# Patient Record
Sex: FEMALE | Race: WHITE | NOT HISPANIC OR LATINO | Employment: OTHER | URBAN - METROPOLITAN AREA
[De-identification: names, ages, dates, MRNs, and addresses within clinical notes are randomized per-mention and may not be internally consistent; named-entity substitution may affect disease eponyms.]

---

## 2023-02-23 LAB
ALANINE AMINOTRANSFERASE (SGPT) (U/L) IN SER/PLAS: 22 U/L (ref 7–45)
ALBUMIN (G/DL) IN SER/PLAS: 4.7 G/DL (ref 3.4–5)
ALKALINE PHOSPHATASE (U/L) IN SER/PLAS: 79 U/L (ref 33–136)
ANION GAP IN SER/PLAS: 15 MMOL/L (ref 10–20)
ASPARTATE AMINOTRANSFERASE (SGOT) (U/L) IN SER/PLAS: 20 U/L (ref 9–39)
BASOPHILS (10*3/UL) IN BLOOD BY AUTOMATED COUNT: 0.04 X10E9/L (ref 0–0.1)
BASOPHILS/100 LEUKOCYTES IN BLOOD BY AUTOMATED COUNT: 0.5 % (ref 0–2)
BILIRUBIN DIRECT (MG/DL) IN SER/PLAS: 0.1 MG/DL (ref 0–0.3)
BILIRUBIN TOTAL (MG/DL) IN SER/PLAS: 0.4 MG/DL (ref 0–1.2)
CALCIDIOL (25 OH VITAMIN D3) (NG/ML) IN SER/PLAS: 44 NG/ML
CALCIUM (MG/DL) IN SER/PLAS: 9.6 MG/DL (ref 8.6–10.6)
CARBON DIOXIDE, TOTAL (MMOL/L) IN SER/PLAS: 24 MMOL/L (ref 21–32)
CHLORIDE (MMOL/L) IN SER/PLAS: 104 MMOL/L (ref 98–107)
CHOLESTEROL (MG/DL) IN SER/PLAS: 245 MG/DL (ref 0–199)
CHOLESTEROL IN HDL (MG/DL) IN SER/PLAS: 59.3 MG/DL
CHOLESTEROL/HDL RATIO: 4.1
CREATININE (MG/DL) IN SER/PLAS: 0.61 MG/DL (ref 0.5–1.05)
EOSINOPHILS (10*3/UL) IN BLOOD BY AUTOMATED COUNT: 0.29 X10E9/L (ref 0–0.7)
EOSINOPHILS/100 LEUKOCYTES IN BLOOD BY AUTOMATED COUNT: 3.9 % (ref 0–6)
ERYTHROCYTE DISTRIBUTION WIDTH (RATIO) BY AUTOMATED COUNT: 13.5 % (ref 11.5–14.5)
ERYTHROCYTE MEAN CORPUSCULAR HEMOGLOBIN CONCENTRATION (G/DL) BY AUTOMATED: 31.9 G/DL (ref 32–36)
ERYTHROCYTE MEAN CORPUSCULAR VOLUME (FL) BY AUTOMATED COUNT: 95 FL (ref 80–100)
ERYTHROCYTES (10*6/UL) IN BLOOD BY AUTOMATED COUNT: 4.17 X10E12/L (ref 4–5.2)
GFR FEMALE: >90 ML/MIN/1.73M2
GLUCOSE (MG/DL) IN SER/PLAS: 90 MG/DL (ref 74–99)
HEMATOCRIT (%) IN BLOOD BY AUTOMATED COUNT: 39.8 % (ref 36–46)
HEMOGLOBIN (G/DL) IN BLOOD: 12.7 G/DL (ref 12–16)
IMMATURE GRANULOCYTES/100 LEUKOCYTES IN BLOOD BY AUTOMATED COUNT: 0.5 % (ref 0–0.9)
LDL: 157 MG/DL (ref 0–99)
LEUKOCYTES (10*3/UL) IN BLOOD BY AUTOMATED COUNT: 7.5 X10E9/L (ref 4.4–11.3)
LYMPHOCYTES (10*3/UL) IN BLOOD BY AUTOMATED COUNT: 2.54 X10E9/L (ref 1.2–4.8)
LYMPHOCYTES/100 LEUKOCYTES IN BLOOD BY AUTOMATED COUNT: 33.8 % (ref 13–44)
MONOCYTES (10*3/UL) IN BLOOD BY AUTOMATED COUNT: 0.61 X10E9/L (ref 0.1–1)
MONOCYTES/100 LEUKOCYTES IN BLOOD BY AUTOMATED COUNT: 8.1 % (ref 2–10)
NEUTROPHILS (10*3/UL) IN BLOOD BY AUTOMATED COUNT: 3.99 X10E9/L (ref 1.2–7.7)
NEUTROPHILS/100 LEUKOCYTES IN BLOOD BY AUTOMATED COUNT: 53.2 % (ref 40–80)
NRBC (PER 100 WBCS) BY AUTOMATED COUNT: 0 /100 WBC (ref 0–0)
PLATELETS (10*3/UL) IN BLOOD AUTOMATED COUNT: 280 X10E9/L (ref 150–450)
POTASSIUM (MMOL/L) IN SER/PLAS: 4.3 MMOL/L (ref 3.5–5.3)
PROTEIN TOTAL: 6.7 G/DL (ref 6.4–8.2)
SODIUM (MMOL/L) IN SER/PLAS: 139 MMOL/L (ref 136–145)
THYROTROPIN (MIU/L) IN SER/PLAS BY DETECTION LIMIT <= 0.05 MIU/L: 1.15 MIU/L (ref 0.44–3.98)
TRIGLYCERIDE (MG/DL) IN SER/PLAS: 146 MG/DL (ref 0–149)
UREA NITROGEN (MG/DL) IN SER/PLAS: 13 MG/DL (ref 6–23)
VLDL: 29 MG/DL (ref 0–40)

## 2023-02-28 PROBLEM — N81.10 FEMALE CYSTOCELE: Status: ACTIVE | Noted: 2023-02-28

## 2023-02-28 PROBLEM — M48.061 SPINAL STENOSIS OF LUMBAR REGION WITHOUT NEUROGENIC CLAUDICATION: Status: ACTIVE | Noted: 2023-02-28

## 2023-02-28 PROBLEM — B35.1 ONYCHOMYCOSIS: Status: ACTIVE | Noted: 2023-02-28

## 2023-02-28 PROBLEM — N39.3 STRESS INCONTINENCE: Status: ACTIVE | Noted: 2023-02-28

## 2023-02-28 PROBLEM — E78.2 COMBINED HYPERLIPIDEMIA: Status: ACTIVE | Noted: 2023-02-28

## 2023-02-28 PROBLEM — R79.89 LOW VITAMIN D LEVEL: Status: ACTIVE | Noted: 2023-02-28

## 2023-02-28 PROBLEM — M46.1 SACROILIITIS (CMS-HCC): Status: ACTIVE | Noted: 2023-02-28

## 2023-02-28 PROBLEM — M81.0 OSTEOPOROSIS: Status: ACTIVE | Noted: 2023-02-28

## 2023-02-28 PROBLEM — R00.2 PALPITATIONS: Status: ACTIVE | Noted: 2023-02-28

## 2023-02-28 RX ORDER — MELOXICAM 15 MG/1
1 TABLET ORAL DAILY
COMMUNITY
Start: 2022-08-11 | End: 2023-07-26 | Stop reason: ALTCHOICE

## 2023-02-28 RX ORDER — MULTIVITAMIN
TABLET ORAL
COMMUNITY

## 2023-02-28 RX ORDER — DENOSUMAB 60 MG/ML
60 INJECTION SUBCUTANEOUS
COMMUNITY
Start: 2020-06-15 | End: 2023-11-07 | Stop reason: SDUPTHER

## 2023-02-28 RX ORDER — MINERAL OIL
ENEMA (ML) RECTAL
COMMUNITY

## 2023-02-28 RX ORDER — LATANOPROST 50 UG/ML
SOLUTION/ DROPS OPHTHALMIC
COMMUNITY
Start: 2021-09-13

## 2023-02-28 RX ORDER — TERBINAFINE HYDROCHLORIDE 250 MG/1
250 TABLET ORAL DAILY
COMMUNITY

## 2023-03-07 ENCOUNTER — APPOINTMENT (OUTPATIENT)
Dept: PRIMARY CARE | Facility: CLINIC | Age: 68
End: 2023-03-07
Payer: MEDICARE

## 2023-06-27 ENCOUNTER — TELEPHONE (OUTPATIENT)
Dept: PRIMARY CARE | Facility: CLINIC | Age: 68
End: 2023-06-27
Payer: MEDICARE

## 2023-06-27 NOTE — TELEPHONE ENCOUNTER
PHARMACY CALLED STATING THE PATIENT NEEDS A PRIOR AUTH ON THEIR PROLIA. ITS [facility] [phone]  PRIOR AUTH KEY IS [id number]

## 2023-07-12 ENCOUNTER — TELEPHONE (OUTPATIENT)
Dept: PRIMARY CARE | Facility: CLINIC | Age: 68
End: 2023-07-12
Payer: MEDICARE

## 2023-07-12 NOTE — TELEPHONE ENCOUNTER
PT STATES THEY NEED PROOF THAT HE PROLIA IS MEDICALLY NECCESSARY DUE TO BROKEN BONES IN THE LAST 5 YRS AND TRIED ALENDRONATE THEN IT BECAME INEFFECTIVE. CAN YOU ADD IT IN THE NOTES?    # FOR .[phone]

## 2023-07-26 ENCOUNTER — OFFICE VISIT (OUTPATIENT)
Dept: PRIMARY CARE | Facility: CLINIC | Age: 68
End: 2023-07-26
Payer: MEDICARE

## 2023-07-26 VITALS
HEART RATE: 75 BPM | BODY MASS INDEX: 30.43 KG/M2 | WEIGHT: 155 LBS | SYSTOLIC BLOOD PRESSURE: 130 MMHG | HEIGHT: 60 IN | OXYGEN SATURATION: 98 % | DIASTOLIC BLOOD PRESSURE: 78 MMHG

## 2023-07-26 DIAGNOSIS — M46.1 SACROILIITIS (CMS-HCC): ICD-10-CM

## 2023-07-26 DIAGNOSIS — M81.0 OSTEOPOROSIS, UNSPECIFIED OSTEOPOROSIS TYPE, UNSPECIFIED PATHOLOGICAL FRACTURE PRESENCE: ICD-10-CM

## 2023-07-26 DIAGNOSIS — Z00.00 ROUTINE GENERAL MEDICAL EXAMINATION AT HEALTH CARE FACILITY: Primary | ICD-10-CM

## 2023-07-26 DIAGNOSIS — E78.2 COMBINED HYPERLIPIDEMIA: ICD-10-CM

## 2023-07-26 DIAGNOSIS — E78.00 PURE HYPERCHOLESTEROLEMIA: ICD-10-CM

## 2023-07-26 DIAGNOSIS — M48.061 SPINAL STENOSIS OF LUMBAR REGION WITHOUT NEUROGENIC CLAUDICATION: ICD-10-CM

## 2023-07-26 DIAGNOSIS — Z00.00 MEDICARE ANNUAL WELLNESS VISIT, SUBSEQUENT: ICD-10-CM

## 2023-07-26 PROBLEM — B35.1 ONYCHOMYCOSIS: Status: RESOLVED | Noted: 2023-02-28 | Resolved: 2023-07-26

## 2023-07-26 PROCEDURE — 1160F RVW MEDS BY RX/DR IN RCRD: CPT | Performed by: FAMILY MEDICINE

## 2023-07-26 PROCEDURE — 1170F FXNL STATUS ASSESSED: CPT | Performed by: FAMILY MEDICINE

## 2023-07-26 PROCEDURE — 1036F TOBACCO NON-USER: CPT | Performed by: FAMILY MEDICINE

## 2023-07-26 PROCEDURE — 1159F MED LIST DOCD IN RCRD: CPT | Performed by: FAMILY MEDICINE

## 2023-07-26 PROCEDURE — 99397 PER PM REEVAL EST PAT 65+ YR: CPT | Performed by: FAMILY MEDICINE

## 2023-07-26 PROCEDURE — G0439 PPPS, SUBSEQ VISIT: HCPCS | Performed by: FAMILY MEDICINE

## 2023-07-26 RX ORDER — ERGOCALCIFEROL 1.25 MG/1
50000 CAPSULE ORAL WEEKLY
COMMUNITY
Start: 2011-10-31

## 2023-07-26 ASSESSMENT — PATIENT HEALTH QUESTIONNAIRE - PHQ9
1. LITTLE INTEREST OR PLEASURE IN DOING THINGS: NOT AT ALL
2. FEELING DOWN, DEPRESSED OR HOPELESS: NOT AT ALL
SUM OF ALL RESPONSES TO PHQ9 QUESTIONS 1 AND 2: 0

## 2023-07-26 ASSESSMENT — ACTIVITIES OF DAILY LIVING (ADL)
DOING_HOUSEWORK: INDEPENDENT
MANAGING_FINANCES: INDEPENDENT
GROCERY_SHOPPING: INDEPENDENT
TAKING_MEDICATION: INDEPENDENT
DRESSING: INDEPENDENT
BATHING: INDEPENDENT

## 2023-07-26 ASSESSMENT — ENCOUNTER SYMPTOMS
DEPRESSION: 0
LOSS OF SENSATION IN FEET: 0
OCCASIONAL FEELINGS OF UNSTEADINESS: 0

## 2023-07-26 NOTE — PROGRESS NOTES
Subjective   Patient ID: Ivette Tang is a 68 y.o. female who presents for Medicare Annual Wellness Visit Subsequent (MEDICARE WELLNESS ).    HPI   Pt is doing well  Except insurance is denying her prolia  Review of Systems   All other systems reviewed and are negative.      Objective   /78   Pulse 75   Ht 1.524 m (5')   Wt 70.3 kg (155 lb)   SpO2 98%   BMI 30.27 kg/m²     Physical Exam  Constitutional:       Appearance: Normal appearance.   HENT:      Head: Normocephalic and atraumatic.      Right Ear: Tympanic membrane, ear canal and external ear normal.      Left Ear: Tympanic membrane, ear canal and external ear normal.      Nose: Nose normal.      Mouth/Throat:      Mouth: Mucous membranes are moist.      Pharynx: Oropharynx is clear.   Eyes:      Extraocular Movements: Extraocular movements intact.      Conjunctiva/sclera: Conjunctivae normal.      Pupils: Pupils are equal, round, and reactive to light.   Cardiovascular:      Rate and Rhythm: Normal rate and regular rhythm.      Pulses: Normal pulses.      Heart sounds: Normal heart sounds.   Pulmonary:      Effort: Pulmonary effort is normal.      Breath sounds: Normal breath sounds.   Abdominal:      General: Abdomen is flat. Bowel sounds are normal.      Palpations: Abdomen is soft.   Genitourinary:     Rectum: Normal.   Musculoskeletal:         General: Normal range of motion.      Cervical back: Normal range of motion and neck supple.   Skin:     General: Skin is warm and dry.      Capillary Refill: Capillary refill takes less than 2 seconds.   Neurological:      General: No focal deficit present.      Mental Status: She is alert and oriented to person, place, and time.   Psychiatric:         Mood and Affect: Mood normal.         Behavior: Behavior normal.         Thought Content: Thought content normal.         Assessment/Plan   Diagnoses and all orders for this visit:  Routine general medical examination at health care facility  Medicare  annual wellness visit, subsequent  Spinal stenosis of lumbar region without neurogenic claudication  Osteoporosis, unspecified osteoporosis type, unspecified pathological fracture presence  Sacroiliitis (CMS/HCC)  Combined hyperlipidemia  Pure hypercholesterolemia  -     Lipid Panel; Future  -     TSH with reflex to Free T4 if abnormal; Future    Pt has had hx fractures and cannot tolerate Aldondrenate do to Gi distress   Medicare Wellness Billing Compliance Satisfied    *This is a visual tool to show completion of required items on the day of the visit. Green checks will only appear on the date of visit.    Review all medications by prescribing practitioner or clinical pharmacist (such as prescriptions, OTCs, herbal therapies and supplements) documented in the medical record    Past Medical, Surgical, and Family History reviewed and updated in chart    Tobacco Use Reviewed    Alcohol Use Reviewed    Illicit Drug Use Reviewed    PHQ2/9    Falls in Last Year Reviewed    Home Safety Risk Factors Reviewed    Cognitive Impairment Reviewed    Patient Self Assessment and Health Status    Current Diet Reviewed    Exercise Frequency    ADL - Hearing Impairment    ADL - Bathing    ADL - Dressing    ADL - Walks in Home    IADL - Managing Finances    IADL - Grocery Shopping    IADL - Taking Medications    IADL - Doing Housework

## 2023-07-26 NOTE — PROGRESS NOTES
Subjective   Reason for Visit: Ivette Tang is an 68 y.o. female here for a Medicare Wellness visit.     Past Medical, Surgical, and Family History reviewed and updated in chart.    Reviewed all medications by prescribing practitioner or clinical pharmacist (such as prescriptions, OTCs, herbal therapies and supplements) and documented in the medical record.    HPI    Patient Care Team:  Olga Baptiste DO as PCP - General  Olga Baptiste DO as PCP - [employer] PCP     Review of Systems    Objective   Vitals:  /78   Pulse 75   Ht 1.524 m (5')   Wt 70.3 kg (155 lb)   SpO2 98%   BMI 30.27 kg/m²       Physical Exam    Assessment/Plan   Problem List Items Addressed This Visit     Osteoporosis    Sacroiliitis (CMS/HCC)    Spinal stenosis of lumbar region without neurogenic claudication    Combined hyperlipidemia    Medicare annual wellness visit, subsequent   Other Visit Diagnoses     Routine general medical examination at health care facility    -  Primary    Pure hypercholesterolemia        Relevant Orders    Lipid Panel    TSH with reflex to Free T4 if abnormal

## 2023-09-11 ENCOUNTER — LAB (OUTPATIENT)
Dept: LAB | Facility: LAB | Age: 68
End: 2023-09-11
Payer: MEDICARE

## 2023-09-11 DIAGNOSIS — E78.00 PURE HYPERCHOLESTEROLEMIA: ICD-10-CM

## 2023-09-11 LAB
CHOLESTEROL (MG/DL) IN SER/PLAS: 244 MG/DL (ref 0–199)
CHOLESTEROL IN HDL (MG/DL) IN SER/PLAS: 58.4 MG/DL
CHOLESTEROL/HDL RATIO: 4.2
LDL: 153 MG/DL (ref 0–99)
THYROTROPIN (MIU/L) IN SER/PLAS BY DETECTION LIMIT <= 0.05 MIU/L: 1.7 MIU/L (ref 0.44–3.98)
TRIGLYCERIDE (MG/DL) IN SER/PLAS: 163 MG/DL (ref 0–149)
VLDL: 33 MG/DL (ref 0–40)

## 2023-09-11 PROCEDURE — 36415 COLL VENOUS BLD VENIPUNCTURE: CPT

## 2023-09-11 PROCEDURE — 84443 ASSAY THYROID STIM HORMONE: CPT

## 2023-09-11 PROCEDURE — 80061 LIPID PANEL: CPT

## 2023-11-07 ENCOUNTER — TELEPHONE (OUTPATIENT)
Dept: PRIMARY CARE | Facility: CLINIC | Age: 68
End: 2023-11-07
Payer: MEDICARE

## 2023-11-07 DIAGNOSIS — M81.0 OSTEOPOROSIS, UNSPECIFIED OSTEOPOROSIS TYPE, UNSPECIFIED PATHOLOGICAL FRACTURE PRESENCE: Primary | ICD-10-CM

## 2023-11-07 RX ORDER — DENOSUMAB 60 MG/ML
60 INJECTION SUBCUTANEOUS
Qty: 1 EACH | Refills: 1 | Status: SHIPPED | OUTPATIENT
Start: 2023-11-07

## 2023-11-07 NOTE — TELEPHONE ENCOUNTER
PT CALLED STATING SHE GOT 3 PROLIA INJECTIONS APPROVED BUT PILAR NEEDS THE SCRIPT FAXED TO THEM   FAX# [phone]  PRIOR AUTH CODE #[id number]

## 2023-11-10 ENCOUNTER — OFFICE VISIT (OUTPATIENT)
Dept: PRIMARY CARE | Facility: CLINIC | Age: 68
End: 2023-11-10
Payer: MEDICARE

## 2023-11-10 DIAGNOSIS — M81.0 OSTEOPOROSIS, UNSPECIFIED OSTEOPOROSIS TYPE, UNSPECIFIED PATHOLOGICAL FRACTURE PRESENCE: ICD-10-CM

## 2023-11-10 PROCEDURE — 96372 THER/PROPH/DIAG INJ SC/IM: CPT | Performed by: FAMILY MEDICINE

## 2023-11-10 PROCEDURE — 1159F MED LIST DOCD IN RCRD: CPT | Performed by: FAMILY MEDICINE

## 2023-11-10 PROCEDURE — 1160F RVW MEDS BY RX/DR IN RCRD: CPT | Performed by: FAMILY MEDICINE

## 2023-11-10 PROCEDURE — 1036F TOBACCO NON-USER: CPT | Performed by: FAMILY MEDICINE

## 2024-03-05 ENCOUNTER — HOSPITAL ENCOUNTER (OUTPATIENT)
Dept: RADIOLOGY | Facility: CLINIC | Age: 69
Discharge: HOME | End: 2024-03-05
Payer: MEDICARE

## 2024-03-05 VITALS — BODY MASS INDEX: 30.43 KG/M2 | WEIGHT: 154.98 LBS | HEIGHT: 60 IN

## 2024-03-05 DIAGNOSIS — Z12.31 SCREENING MAMMOGRAM FOR BREAST CANCER: ICD-10-CM

## 2024-03-05 PROCEDURE — 77067 SCR MAMMO BI INCL CAD: CPT | Performed by: RADIOLOGY

## 2024-03-05 PROCEDURE — 77067 SCR MAMMO BI INCL CAD: CPT

## 2024-03-05 PROCEDURE — 77063 BREAST TOMOSYNTHESIS BI: CPT | Performed by: RADIOLOGY

## 2024-04-09 ENCOUNTER — TELEPHONE (OUTPATIENT)
Dept: PRIMARY CARE | Facility: CLINIC | Age: 69
End: 2024-04-09
Payer: MEDICARE

## 2024-04-25 ENCOUNTER — OFFICE VISIT (OUTPATIENT)
Dept: PRIMARY CARE | Facility: CLINIC | Age: 69
End: 2024-04-25
Payer: MEDICARE

## 2024-04-25 ENCOUNTER — HOSPITAL ENCOUNTER (OUTPATIENT)
Dept: RADIOLOGY | Facility: CLINIC | Age: 69
Discharge: HOME | End: 2024-04-25
Payer: MEDICARE

## 2024-04-25 VITALS
OXYGEN SATURATION: 98 % | WEIGHT: 162 LBS | SYSTOLIC BLOOD PRESSURE: 124 MMHG | HEART RATE: 80 BPM | BODY MASS INDEX: 31.64 KG/M2 | DIASTOLIC BLOOD PRESSURE: 80 MMHG

## 2024-04-25 DIAGNOSIS — M81.0 OSTEOPOROSIS, UNSPECIFIED OSTEOPOROSIS TYPE, UNSPECIFIED PATHOLOGICAL FRACTURE PRESENCE: ICD-10-CM

## 2024-04-25 DIAGNOSIS — R79.89 LOW VITAMIN D LEVEL: ICD-10-CM

## 2024-04-25 DIAGNOSIS — M46.1 SACROILIITIS (CMS-HCC): ICD-10-CM

## 2024-04-25 DIAGNOSIS — R00.2 PALPITATIONS: ICD-10-CM

## 2024-04-25 DIAGNOSIS — M81.0 OSTEOPOROSIS, UNSPECIFIED OSTEOPOROSIS TYPE, UNSPECIFIED PATHOLOGICAL FRACTURE PRESENCE: Primary | ICD-10-CM

## 2024-04-25 DIAGNOSIS — M21.70 LEG LENGTH DISCREPANCY: ICD-10-CM

## 2024-04-25 DIAGNOSIS — E83.31: ICD-10-CM

## 2024-04-25 DIAGNOSIS — E78.2 COMBINED HYPERLIPIDEMIA: ICD-10-CM

## 2024-04-25 PROCEDURE — 72110 X-RAY EXAM L-2 SPINE 4/>VWS: CPT

## 2024-04-25 PROCEDURE — 73502 X-RAY EXAM HIP UNI 2-3 VIEWS: CPT | Mod: RIGHT SIDE | Performed by: RADIOLOGY

## 2024-04-25 PROCEDURE — 73502 X-RAY EXAM HIP UNI 2-3 VIEWS: CPT | Mod: RT

## 2024-04-25 PROCEDURE — 1159F MED LIST DOCD IN RCRD: CPT | Performed by: FAMILY MEDICINE

## 2024-04-25 PROCEDURE — 72110 X-RAY EXAM L-2 SPINE 4/>VWS: CPT | Performed by: RADIOLOGY

## 2024-04-25 PROCEDURE — 1160F RVW MEDS BY RX/DR IN RCRD: CPT | Performed by: FAMILY MEDICINE

## 2024-04-25 PROCEDURE — 1036F TOBACCO NON-USER: CPT | Performed by: FAMILY MEDICINE

## 2024-04-25 PROCEDURE — 99214 OFFICE O/P EST MOD 30 MIN: CPT | Performed by: FAMILY MEDICINE

## 2024-04-25 ASSESSMENT — ENCOUNTER SYMPTOMS
OCCASIONAL FEELINGS OF UNSTEADINESS: 0
DEPRESSION: 0
LOSS OF SENSATION IN FEET: 0

## 2024-04-25 NOTE — PROGRESS NOTES
Subjective   Patient ID: Ivette Tang is a 69 y.o. female who presents for Follow-up.    HPI     Review of Systems   All other systems reviewed and are negative.  Pt has a stress fracture in her foot  Was dx in [place] last fall  No injury  She is exp difficulty getting her Prolia despite osteoporosis an fx  Pt has low back pain  Pt has hip pain  Has leg length discrepancy  Pt denies mood changes    Objective   /80   Pulse 80   Wt 73.5 kg (162 lb)   SpO2 98%   BMI 31.64 kg/m²     Physical Exam  Constitutional:       Appearance: Normal appearance.   HENT:      Head: Normocephalic and atraumatic.      Right Ear: Tympanic membrane, ear canal and external ear normal.      Left Ear: Tympanic membrane, ear canal and external ear normal.      Nose: Nose normal.      Mouth/Throat:      Mouth: Mucous membranes are moist.      Pharynx: Oropharynx is clear.   Eyes:      Extraocular Movements: Extraocular movements intact.      Conjunctiva/sclera: Conjunctivae normal.      Pupils: Pupils are equal, round, and reactive to light.   Cardiovascular:      Rate and Rhythm: Normal rate and regular rhythm.      Pulses: Normal pulses.      Heart sounds: Normal heart sounds.   Pulmonary:      Effort: Pulmonary effort is normal.      Breath sounds: Normal breath sounds.   Abdominal:      General: Abdomen is flat. Bowel sounds are normal.      Palpations: Abdomen is soft.   Genitourinary:     Comments: nl  Musculoskeletal:         General: Normal range of motion.      Cervical back: Normal range of motion and neck supple.   Skin:     General: Skin is warm and dry.      Capillary Refill: Capillary refill takes less than 2 seconds.   Neurological:      General: No focal deficit present.      Mental Status: She is alert and oriented to person, place, and time.   Psychiatric:         Mood and Affect: Mood normal.         Behavior: Behavior normal.         Thought Content: Thought content normal.         Assessment/Plan   Diagnoses  and all orders for this visit:  Osteoporosis, unspecified osteoporosis type, unspecified pathological fracture presence  -     Referral to Rheumatology; Future  -     TSH with reflex to Free T4 if abnormal; Future  -     Lipid Panel; Future  -     Comprehensive Metabolic Panel; Future  -     CBC and Auto Differential; Future  -     XR lumbar spine 2-3 views; Future  -     XR hip right with pelvis when performed 2 or 3 views; Future  Sacroiliitis (CMS-[facility])  -     TSH with reflex to Free T4 if abnormal; Future  -     Lipid Panel; Future  -     Comprehensive Metabolic Panel; Future  -     CBC and Auto Differential; Future  -     XR lumbar spine 2-3 views; Future  -     XR hip right with pelvis when performed 2 or 3 views; Future  Palpitations  -     TSH with reflex to Free T4 if abnormal; Future  -     Lipid Panel; Future  -     Comprehensive Metabolic Panel; Future  -     CBC and Auto Differential; Future  Low vitamin D level  -     TSH with reflex to Free T4 if abnormal; Future  -     Lipid Panel; Future  -     Comprehensive Metabolic Panel; Future  -     Vitamin D 25-Hydroxy,Total (for eval of Vitamin D levels); Future  Combined hyperlipidemia  -     TSH with reflex to Free T4 if abnormal; Future  -     Lipid Panel; Future  -     Comprehensive Metabolic Panel; Future  -     CBC and Auto Differential; Future  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

## 2024-05-08 ENCOUNTER — OFFICE VISIT (OUTPATIENT)
Dept: PRIMARY CARE | Facility: CLINIC | Age: 69
End: 2024-05-08
Payer: MEDICARE

## 2024-05-08 DIAGNOSIS — M81.0 OSTEOPOROSIS, UNSPECIFIED OSTEOPOROSIS TYPE, UNSPECIFIED PATHOLOGICAL FRACTURE PRESENCE: Primary | ICD-10-CM

## 2024-05-08 PROCEDURE — 1159F MED LIST DOCD IN RCRD: CPT | Performed by: FAMILY MEDICINE

## 2024-05-08 PROCEDURE — 96372 THER/PROPH/DIAG INJ SC/IM: CPT | Performed by: FAMILY MEDICINE

## 2024-05-08 PROCEDURE — 1160F RVW MEDS BY RX/DR IN RCRD: CPT | Performed by: FAMILY MEDICINE

## 2024-06-08 ENCOUNTER — HOSPITAL ENCOUNTER (OUTPATIENT)
Dept: RADIOLOGY | Facility: EXTERNAL LOCATION | Age: 69
Discharge: HOME | End: 2024-06-08
Payer: MEDICARE

## 2024-06-08 DIAGNOSIS — M25.562 LEFT KNEE PAIN, UNSPECIFIED CHRONICITY: ICD-10-CM

## 2024-06-10 ENCOUNTER — OFFICE VISIT (OUTPATIENT)
Dept: RHEUMATOLOGY | Facility: CLINIC | Age: 69
End: 2024-06-10
Payer: MEDICARE

## 2024-06-10 VITALS — WEIGHT: 162 LBS | DIASTOLIC BLOOD PRESSURE: 79 MMHG | BODY MASS INDEX: 31.64 KG/M2 | SYSTOLIC BLOOD PRESSURE: 153 MMHG

## 2024-06-10 DIAGNOSIS — M70.61 TROCHANTERIC BURSITIS OF RIGHT HIP: Primary | ICD-10-CM

## 2024-06-10 DIAGNOSIS — M81.0 OSTEOPOROSIS, UNSPECIFIED OSTEOPOROSIS TYPE, UNSPECIFIED PATHOLOGICAL FRACTURE PRESENCE: ICD-10-CM

## 2024-06-10 PROCEDURE — 1159F MED LIST DOCD IN RCRD: CPT | Performed by: INTERNAL MEDICINE

## 2024-06-10 PROCEDURE — 20610 DRAIN/INJ JOINT/BURSA W/O US: CPT | Performed by: INTERNAL MEDICINE

## 2024-06-10 PROCEDURE — 99204 OFFICE O/P NEW MOD 45 MIN: CPT | Performed by: INTERNAL MEDICINE

## 2024-06-10 PROCEDURE — 1036F TOBACCO NON-USER: CPT | Performed by: INTERNAL MEDICINE

## 2024-06-10 RX ORDER — LIDOCAINE HYDROCHLORIDE 10 MG/ML
1 INJECTION INFILTRATION; PERINEURAL ONCE
Status: COMPLETED | OUTPATIENT
Start: 2024-06-10 | End: 2024-06-10

## 2024-06-10 RX ORDER — TRIAMCINOLONE ACETONIDE 40 MG/ML
20 INJECTION, SUSPENSION INTRA-ARTICULAR; INTRAMUSCULAR ONCE
Status: COMPLETED | OUTPATIENT
Start: 2024-06-10 | End: 2024-06-10

## 2024-06-10 RX ORDER — TRIAMCINOLONE ACETONIDE 40 MG/ML
20 INJECTION, SUSPENSION INTRA-ARTICULAR; INTRAMUSCULAR
Status: COMPLETED | OUTPATIENT
Start: 2024-06-10 | End: 2024-06-10

## 2024-06-10 RX ORDER — LIDOCAINE HYDROCHLORIDE 10 MG/ML
0.5 INJECTION INFILTRATION; PERINEURAL
Status: COMPLETED | OUTPATIENT
Start: 2024-06-10 | End: 2024-06-10

## 2024-06-10 RX ADMIN — LIDOCAINE HYDROCHLORIDE 10 MG: 10 INJECTION INFILTRATION; PERINEURAL at 10:28

## 2024-06-10 RX ADMIN — TRIAMCINOLONE ACETONIDE 20 MG: 40 INJECTION, SUSPENSION INTRA-ARTICULAR; INTRAMUSCULAR at 10:25

## 2024-06-10 RX ADMIN — TRIAMCINOLONE ACETONIDE 20 MG: 40 INJECTION, SUSPENSION INTRA-ARTICULAR; INTRAMUSCULAR at 10:28

## 2024-06-10 RX ADMIN — LIDOCAINE HYDROCHLORIDE 0.5 ML: 10 INJECTION INFILTRATION; PERINEURAL at 10:25

## 2024-06-10 NOTE — PROGRESS NOTES
Subjective  . Ivette Tang is a 69 y.o. female who presents for New Patient Visit (Pain in joints).    HPI.  69-year-old female with history of osteoporosis and lumbar spondylosis presented for joint pain evaluation.    She noticed small bony prominences at the DIP joints of the fingers.  The right little finger DIP joint hurts at times.  She also reports right hip pain upon getting up and walking and laying on her right side.  X-ray of the lumbar spine showed multilevel degenerative changes and levocurvature.  X-ray of the right hip was unremarkable.    She stated that she is on Prolia for the past 4 years.  She is getting Prolia through primary care physician.  There was difficulty of renewing the Prolia.  She stated that that issue has been resolved.  She stated that she will continue to get Prolia from her primary care physician's office.    No recent fall or fractures.    Social history: She is .  She does not smoke.  She socially drinks.  She is retired teacher.  Family history: Mother and sister has osteoporosis.  Mother also has had arthritis.    Review of Systems   All other systems reviewed and are negative.  Objective     Blood pressure 153/79, weight 73.5 kg (162 lb).    Physical Exam.  Gen. AAO x3, NAD.  HEENT: No pallor or icterus, PERRLA, EOMI. Oropharynx is clear. MM moist,Parotid glands  not enlarged. No cervical lymphadenopathy .  Skin: No rashes.  Heart: S1, S2/ RRR. No murmurs or gallops.  Lungs: CTA B.  Abdomen: Soft, NT/ND, BS regular.  MSK: No.swelling or tenderness of the  upper or lower extremity joints.  Bilateral Heberden's nodes, more prominent at the right little finger.  Neck, spine and SI joint without tenderness.  Right trochanteric bursa tenderness.  Bilateral knee with mild patellofemoral crepitation.  Bilateral ankle and MTPs without swelling and tenderness.    Neuro: CN II-XII intact. Sensation to touch intact.Strength 5/5 throughout. DTR 2+ and  symmetrical.  Psych:Appropriate mood and behavior  EXT: No edema    Assessment/Plan . 69-year-old female with history of osteoporosis and lumbar spondylosis presented for joint pain evaluation.    #1: Discussed with patient that she has osteoarthritis/DJD of the hands, lumbar spine and knees.  She also has right trochanteric bursitis.  Patient was counseled regarding the arthritis management and outcomes in length.  -Kenalog 20 mg mixed with 1 mL of 1% lidocaine injected to the right trochanteric bursa.    #2: Osteoporosis.  -She stated that she will continue to get Prolia through her primary care physician's office.    Follow-up as needed.     This note was partially generated using the Dragon Voice recognition system. There may be some incorrect wording, spelling and/or spelling errors or punctuation errors that were not corrected prior to committing the note to the medical record.      Problem List Items Addressed This Visit       Osteoporosis       Active Ambulatory Problems     Diagnosis Date Noted    Female cystocele 02/28/2023    Low vitamin D level 02/28/2023    Osteoporosis 02/28/2023    Palpitations 02/28/2023    Sacroiliitis (CMS-HCC) 02/28/2023    Spinal stenosis of lumbar region without neurogenic claudication 02/28/2023    Combined hyperlipidemia 02/28/2023    Stress incontinence 02/28/2023    Medicare annual wellness visit, subsequent 07/26/2023     Resolved Ambulatory Problems     Diagnosis Date Noted    Onychomycosis 02/28/2023    Hypertrophy of breast 10/31/2011     Past Medical History:   Diagnosis Date    Osteopetrosis ([facility]-HCC)     Personal history of other diseases of the female genital tract     Personal history of other diseases of the nervous system and sense organs     Pure hypercholesterolemia, unspecified        Family History   Problem Relation Name Age of Onset    Anxiety disorder Mother      Clotting disorder Mother      Depression Mother      Parkinsonism Mother      Osteoporosis  Mother      Heart attack Father      Stroke Father      Breast cancer Neg Hx         Past Surgical History:   Procedure Laterality Date    BREAST SURGERY Bilateral     Breast Reduction Procedure    HYSTEROSCOPY  11/13/2014    Hysteroscopy With Endometrial Ablation    TONSILLECTOMY  11/13/2014    Tonsillectomy       Social History     Tobacco Use   Smoking Status Never   Smokeless Tobacco Never       Allergies  Nickel, Penicillins, and Sulfa (sulfonamide antibiotics)    Current Meds  Current Outpatient Medications   Medication Instructions    CALCIUM ORAL (600-200 MG-UNIT TABS)    ergocalciferol (VITAMIN D-2) 50,000 Units, oral, Weekly    fexofenadine (Allegra) 180 mg tablet oral    latanoprost (Xalatan) 0.005 % ophthalmic solution ophthalmic (eye)    multivitamin tablet oral    NON FORMULARY Vitamin D 1000 UNIT TABS    Prolia 60 mg, subcutaneous, Every 6 months    RED YEAST RICE ORAL oral    terbinafine (LAMISIL) 250 mg, oral, Daily        Large Joint Injection/Arthrocentesis: R greater trochanteric bursa on 6/10/2024 10:25 AM  Indications: pain  Details: 25 G needle, lateral approach  Medications: 20 mg triamcinolone acetonide 40 mg/mL; 0.5 mL lidocaine 10 mg/mL (1 %)  Outcome: tolerated well, no immediate complications  Procedure, treatment alternatives, risks and benefits explained, specific risks discussed. Consent was given by the patient. Immediately prior to procedure a time out was called to verify the correct patient, procedure, equipment, support staff and site/side marked as required. Patient was prepped and draped in the usual sterile fashion.       .      Tico Weiner MD

## 2024-06-10 NOTE — LETTER
Denise 10, 2024     Olga Baptiste DO  [address], [address]  [place] [address]    Patient: Ivette Tang   YOB: 1955   Date of Visit: 6/10/2024       Dear Dr. Olga Baptiste DO:    Thank you for referring Ivette Tang to me for evaluation. Below are my notes for this consultation.  If you have questions, please do not hesitate to call me. I look forward to following your patient along with you.       Sincerely,     Tico Weiner MD      CC: No Recipients  ______________________________________________________________________________________    Subjective . Ivette Tang is a 69 y.o. female who presents for New Patient Visit (Pain in joints).    HPI.  69-year-old female with history of osteoporosis and lumbar spondylosis presented for joint pain evaluation.    She noticed small bony prominences at the DIP joints of the fingers.  The right little finger DIP joint hurts at times.  She also reports right hip pain upon getting up and walking and laying on her right side.  X-ray of the lumbar spine showed multilevel degenerative changes and levocurvature.  X-ray of the right hip was unremarkable.    She stated that she is on Prolia for the past 4 years.  She is getting Prolia through primary care physician.  There was difficulty of renewing the Prolia.  She stated that that issue has been resolved.  She stated that she will continue to get Prolia from her primary care physician's office.    No recent fall or fractures.    Social history: She is .  She does not smoke.  She socially drinks.  She is retired teacher.  Family history: Mother and sister has osteoporosis.  Mother also has had arthritis.    Review of Systems   All other systems reviewed and are negative.  Objective    Blood pressure 153/79, weight 73.5 kg (162 lb).    Physical Exam.  Gen. AAO x3, NAD.  HEENT: No pallor or icterus, PERRLA, EOMI. Oropharynx is clear. MM moist,Parotid glands  not enlarged. No  cervical lymphadenopathy .  Skin: No rashes.  Heart: S1, S2/ RRR. No murmurs or gallops.  Lungs: CTA B.  Abdomen: Soft, NT/ND, BS regular.  MSK: No.swelling or tenderness of the  upper or lower extremity joints.  Bilateral Heberden's nodes, more prominent at the right little finger.  Neck, spine and SI joint without tenderness.  Right trochanteric bursa tenderness.  Bilateral knee with mild patellofemoral crepitation.  Bilateral ankle and MTPs without swelling and tenderness.    Neuro: CN II-XII intact. Sensation to touch intact.Strength 5/5 throughout. DTR 2+ and symmetrical.  Psych:Appropriate mood and behavior  EXT: No edema    Assessment/Plan. 69-year-old female with history of osteoporosis and lumbar spondylosis presented for joint pain evaluation.    #1: Discussed with patient that she has osteoarthritis/DJD of the hands, lumbar spine and knees.  She also has right trochanteric bursitis.  Patient was counseled regarding the arthritis management and outcomes in length.  -Kenalog 20 mg mixed with 1 mL of 1% lidocaine injected to the right trochanteric bursa.    #2: Osteoporosis.  -She stated that she will continue to get Prolia through her primary care physician's office.    Follow-up as needed.     This note was partially generated using the Dragon Voice recognition system. There may be some incorrect wording, spelling and/or spelling errors or punctuation errors that were not corrected prior to committing the note to the medical record.      Problem List Items Addressed This Visit       Osteoporosis       Active Ambulatory Problems     Diagnosis Date Noted   • Female cystocele 02/28/2023   • Low vitamin D level 02/28/2023   • Osteoporosis 02/28/2023   • Palpitations 02/28/2023   • Sacroiliitis (CMS-HCC) 02/28/2023   • Spinal stenosis of lumbar region without neurogenic claudication 02/28/2023   • Combined hyperlipidemia 02/28/2023   • Stress incontinence 02/28/2023   • Medicare annual wellness visit,  subsequent 07/26/2023     Resolved Ambulatory Problems     Diagnosis Date Noted   • Onychomycosis 02/28/2023   • Hypertrophy of breast 10/31/2011     Past Medical History:   Diagnosis Date   • Osteopetrosis ([facility])    • Personal history of other diseases of the female genital tract    • Personal history of other diseases of the nervous system and sense organs    • Pure hypercholesterolemia, unspecified        Family History   Problem Relation Name Age of Onset   • Anxiety disorder Mother     • Clotting disorder Mother     • Depression Mother     • Parkinsonism Mother     • Osteoporosis Mother     • Heart attack Father     • Stroke Father     • Breast cancer Neg Hx         Past Surgical History:   Procedure Laterality Date   • BREAST SURGERY Bilateral     Breast Reduction Procedure   • HYSTEROSCOPY  11/13/2014    Hysteroscopy With Endometrial Ablation   • TONSILLECTOMY  11/13/2014    Tonsillectomy       Social History     Tobacco Use   Smoking Status Never   Smokeless Tobacco Never       Allergies  Nickel, Penicillins, and Sulfa (sulfonamide antibiotics)    Current Meds  Current Outpatient Medications   Medication Instructions   • CALCIUM ORAL (600-200 MG-UNIT TABS)   • ergocalciferol (VITAMIN D-2) 50,000 Units, oral, Weekly   • fexofenadine (Allegra) 180 mg tablet oral   • latanoprost (Xalatan) 0.005 % ophthalmic solution ophthalmic (eye)   • multivitamin tablet oral   • NON FORMULARY Vitamin D 1000 UNIT TABS   • Prolia 60 mg, subcutaneous, Every 6 months   • RED YEAST RICE ORAL oral   • terbinafine (LAMISIL) 250 mg, oral, Daily        Large Joint Injection/Arthrocentesis: R greater trochanteric bursa on 6/10/2024 10:25 AM  Indications: pain  Details: 25 G needle, lateral approach  Medications: 20 mg triamcinolone acetonide 40 mg/mL; 0.5 mL lidocaine 10 mg/mL (1 %)  Outcome: tolerated well, no immediate complications  Procedure, treatment alternatives, risks and benefits explained, specific risks discussed.  Consent was given by the patient. Immediately prior to procedure a time out was called to verify the correct patient, procedure, equipment, support staff and site/side marked as required. Patient was prepped and draped in the usual sterile fashion.       .      Tico Weiner MD

## 2024-07-25 ENCOUNTER — APPOINTMENT (OUTPATIENT)
Dept: PODIATRY | Facility: CLINIC | Age: 69
End: 2024-07-25
Payer: MEDICARE

## 2024-07-25 DIAGNOSIS — M20.12 HAV (HALLUX ABDUCTO VALGUS), LEFT: ICD-10-CM

## 2024-07-25 DIAGNOSIS — B35.1 ONYCHOMYCOSIS: Primary | ICD-10-CM

## 2024-07-25 DIAGNOSIS — M20.11 HAV (HALLUX ABDUCTO VALGUS), RIGHT: ICD-10-CM

## 2024-07-25 DIAGNOSIS — M21.70 LEG LENGTH DISCREPANCY: ICD-10-CM

## 2024-07-25 PROCEDURE — 1160F RVW MEDS BY RX/DR IN RCRD: CPT | Performed by: PODIATRIST

## 2024-07-25 PROCEDURE — 1036F TOBACCO NON-USER: CPT | Performed by: PODIATRIST

## 2024-07-25 PROCEDURE — 1159F MED LIST DOCD IN RCRD: CPT | Performed by: PODIATRIST

## 2024-07-25 PROCEDURE — 99203 OFFICE O/P NEW LOW 30 MIN: CPT | Performed by: PODIATRIST

## 2024-07-25 RX ORDER — TRETINOIN 0.25 MG/G
CREAM TOPICAL
COMMUNITY
Start: 2024-05-10

## 2024-07-25 NOTE — PROGRESS NOTES
Chief Complaint   Patient presents with    Foot Pain     New Patient is here today with right foot pain and calluses NEVA.  PCP referred. Had a stress fracture in Jan, went to PCP, was diagnosed with scoliosis and a leg length discrepancy.    HPI:In November 2023 had stress fx metatarsal R foot, treated in [place]. Followed up with PCP, diagnosed with scoliosis, hip alignment issues so she limps.  Had right hip pain but recently had cortisone injection which has helped.  Cedric any pain with ambulation.  Has not tried heel lifts.  Patient also is concerned about thick toenails bilaterally    PMH, PSx, Medications and allergies reviewed.  ROS negative except for what is stated in HPI.    Physical Exam  Patient alert, oriented, no acute distress  Respiratory: non labored breathing  Psychiatric: Mood and affect normal/baseline  HEENT: sclera clear    VASC: +2/4 pedal pulses B/L.  CFT brisk all digits.  Skin temperature is warm to warm proximal distal B/L.  (+)hair growth B/L.   No edema noted B/L    NEURO: Vibratory intact B/L.  Light touch intact B/L.     DERM: Nails are all painted several do appear thick. No cellulitis noted. Mild diffuse hyperkeratotic tissue noted plantar first MPJs bilaterally     MUSCULOSKEL: +5/5 muscle strength B/L.    HAV B/L noted.   LLD R>L    Assessment and Plan  #1 LLD R>L  Discussed findings in detail  Dispensed temporary foam heel lifts to be used on the left side.  Patient instructed on how to use very nice to see which 1 feels the best.  Follow-up in 3-4 weeks at that time we can dispense a more permanent heel lift    #2  Onychomycosis  Nails not fully evaluated secondary to nail polish  Patient instructed to remove nail polish for follow-up visit but it can be evaluated better and treatment options can be discussed in detail  Follow-up 3 to 4 weeks    #3 HAV B/L  Discussed pathology and treatment options  Currently no pain  Patient continuing wide deep shoes  Multiple questions asked  about over-the-counter braces answered questions answered to patient's satisfaction  Follow-up as needed

## 2024-07-26 ENCOUNTER — LAB (OUTPATIENT)
Dept: LAB | Facility: LAB | Age: 69
End: 2024-07-26
Payer: MEDICARE

## 2024-07-26 DIAGNOSIS — R79.89 LOW VITAMIN D LEVEL: ICD-10-CM

## 2024-07-26 DIAGNOSIS — M46.1 SACROILIITIS (CMS-HCC): ICD-10-CM

## 2024-07-26 DIAGNOSIS — E83.31: ICD-10-CM

## 2024-07-26 DIAGNOSIS — E78.2 COMBINED HYPERLIPIDEMIA: ICD-10-CM

## 2024-07-26 DIAGNOSIS — R00.2 PALPITATIONS: ICD-10-CM

## 2024-07-26 DIAGNOSIS — M81.0 OSTEOPOROSIS, UNSPECIFIED OSTEOPOROSIS TYPE, UNSPECIFIED PATHOLOGICAL FRACTURE PRESENCE: ICD-10-CM

## 2024-07-26 LAB
25(OH)D3 SERPL-MCNC: 38 NG/ML (ref 30–100)
ALBUMIN SERPL BCP-MCNC: 4.7 G/DL (ref 3.4–5)
ALP SERPL-CCNC: 64 U/L (ref 33–136)
ALT SERPL W P-5'-P-CCNC: 24 U/L (ref 7–45)
ANION GAP SERPL CALC-SCNC: 14 MMOL/L (ref 10–20)
AST SERPL W P-5'-P-CCNC: 19 U/L (ref 9–39)
BASOPHILS # BLD AUTO: 0.03 X10*3/UL (ref 0–0.1)
BASOPHILS NFR BLD AUTO: 0.5 %
BILIRUB SERPL-MCNC: 0.4 MG/DL (ref 0–1.2)
BUN SERPL-MCNC: 17 MG/DL (ref 6–23)
CALCIUM SERPL-MCNC: 9.5 MG/DL (ref 8.6–10.6)
CHLORIDE SERPL-SCNC: 103 MMOL/L (ref 98–107)
CHOLEST SERPL-MCNC: 232 MG/DL (ref 0–199)
CHOLESTEROL/HDL RATIO: 3.4
CO2 SERPL-SCNC: 26 MMOL/L (ref 21–32)
CREAT SERPL-MCNC: 0.68 MG/DL (ref 0.5–1.05)
EGFRCR SERPLBLD CKD-EPI 2021: >90 ML/MIN/1.73M*2
EOSINOPHIL # BLD AUTO: 0.24 X10*3/UL (ref 0–0.7)
EOSINOPHIL NFR BLD AUTO: 3.7 %
ERYTHROCYTE [DISTWIDTH] IN BLOOD BY AUTOMATED COUNT: 13.4 % (ref 11.5–14.5)
GLUCOSE SERPL-MCNC: 97 MG/DL (ref 74–99)
HCT VFR BLD AUTO: 41.7 % (ref 36–46)
HDLC SERPL-MCNC: 68.3 MG/DL
HGB BLD-MCNC: 13.3 G/DL (ref 12–16)
IMM GRANULOCYTES # BLD AUTO: 0.03 X10*3/UL (ref 0–0.7)
IMM GRANULOCYTES NFR BLD AUTO: 0.5 % (ref 0–0.9)
LDLC SERPL CALC-MCNC: 147 MG/DL
LYMPHOCYTES # BLD AUTO: 2.16 X10*3/UL (ref 1.2–4.8)
LYMPHOCYTES NFR BLD AUTO: 33.5 %
MCH RBC QN AUTO: 30.7 PG (ref 26–34)
MCHC RBC AUTO-ENTMCNC: 31.9 G/DL (ref 32–36)
MCV RBC AUTO: 96 FL (ref 80–100)
MONOCYTES # BLD AUTO: 0.49 X10*3/UL (ref 0.1–1)
MONOCYTES NFR BLD AUTO: 7.6 %
NEUTROPHILS # BLD AUTO: 3.49 X10*3/UL (ref 1.2–7.7)
NEUTROPHILS NFR BLD AUTO: 54.2 %
NON HDL CHOLESTEROL: 164 MG/DL (ref 0–149)
NRBC BLD-RTO: 0 /100 WBCS (ref 0–0)
PLATELET # BLD AUTO: 282 X10*3/UL (ref 150–450)
POTASSIUM SERPL-SCNC: 4.7 MMOL/L (ref 3.5–5.3)
PROT SERPL-MCNC: 6.7 G/DL (ref 6.4–8.2)
RBC # BLD AUTO: 4.33 X10*6/UL (ref 4–5.2)
SODIUM SERPL-SCNC: 138 MMOL/L (ref 136–145)
TRIGL SERPL-MCNC: 85 MG/DL (ref 0–149)
TSH SERPL-ACNC: 1.07 MIU/L (ref 0.44–3.98)
VLDL: 17 MG/DL (ref 0–40)
WBC # BLD AUTO: 6.4 X10*3/UL (ref 4.4–11.3)

## 2024-07-26 PROCEDURE — 82306 VITAMIN D 25 HYDROXY: CPT

## 2024-07-26 PROCEDURE — 80053 COMPREHEN METABOLIC PANEL: CPT

## 2024-07-26 PROCEDURE — 85025 COMPLETE CBC W/AUTO DIFF WBC: CPT

## 2024-07-26 PROCEDURE — 80061 LIPID PANEL: CPT

## 2024-07-26 PROCEDURE — 84443 ASSAY THYROID STIM HORMONE: CPT

## 2024-07-26 PROCEDURE — 36415 COLL VENOUS BLD VENIPUNCTURE: CPT

## 2024-07-29 ENCOUNTER — APPOINTMENT (OUTPATIENT)
Dept: PRIMARY CARE | Facility: CLINIC | Age: 69
End: 2024-07-29
Payer: MEDICARE

## 2024-07-29 VITALS
OXYGEN SATURATION: 98 % | DIASTOLIC BLOOD PRESSURE: 78 MMHG | SYSTOLIC BLOOD PRESSURE: 118 MMHG | BODY MASS INDEX: 32 KG/M2 | HEIGHT: 60 IN | HEART RATE: 72 BPM | WEIGHT: 163 LBS

## 2024-07-29 DIAGNOSIS — M48.061 SPINAL STENOSIS OF LUMBAR REGION WITHOUT NEUROGENIC CLAUDICATION: ICD-10-CM

## 2024-07-29 DIAGNOSIS — E78.2 COMBINED HYPERLIPIDEMIA: ICD-10-CM

## 2024-07-29 DIAGNOSIS — S86.912A STRAIN OF LEFT KNEE, INITIAL ENCOUNTER: ICD-10-CM

## 2024-07-29 DIAGNOSIS — Z00.00 MEDICARE ANNUAL WELLNESS VISIT, SUBSEQUENT: Primary | ICD-10-CM

## 2024-07-29 DIAGNOSIS — M81.0 OSTEOPOROSIS, UNSPECIFIED OSTEOPOROSIS TYPE, UNSPECIFIED PATHOLOGICAL FRACTURE PRESENCE: ICD-10-CM

## 2024-07-29 DIAGNOSIS — M46.1 SACROILIITIS (CMS-HCC): ICD-10-CM

## 2024-07-29 DIAGNOSIS — Z00.00 ROUTINE GENERAL MEDICAL EXAMINATION AT HEALTH CARE FACILITY: ICD-10-CM

## 2024-07-29 PROCEDURE — 1158F ADVNC CARE PLAN TLK DOCD: CPT | Performed by: FAMILY MEDICINE

## 2024-07-29 PROCEDURE — 3008F BODY MASS INDEX DOCD: CPT | Performed by: FAMILY MEDICINE

## 2024-07-29 PROCEDURE — 1036F TOBACCO NON-USER: CPT | Performed by: FAMILY MEDICINE

## 2024-07-29 PROCEDURE — 1159F MED LIST DOCD IN RCRD: CPT | Performed by: FAMILY MEDICINE

## 2024-07-29 PROCEDURE — 99397 PER PM REEVAL EST PAT 65+ YR: CPT | Performed by: FAMILY MEDICINE

## 2024-07-29 PROCEDURE — G0439 PPPS, SUBSEQ VISIT: HCPCS | Performed by: FAMILY MEDICINE

## 2024-07-29 PROCEDURE — 1170F FXNL STATUS ASSESSED: CPT | Performed by: FAMILY MEDICINE

## 2024-07-29 PROCEDURE — 1123F ACP DISCUSS/DSCN MKR DOCD: CPT | Performed by: FAMILY MEDICINE

## 2024-07-29 PROCEDURE — 1160F RVW MEDS BY RX/DR IN RCRD: CPT | Performed by: FAMILY MEDICINE

## 2024-07-29 PROCEDURE — 99214 OFFICE O/P EST MOD 30 MIN: CPT | Performed by: FAMILY MEDICINE

## 2024-07-29 ASSESSMENT — PATIENT HEALTH QUESTIONNAIRE - PHQ9
2. FEELING DOWN, DEPRESSED OR HOPELESS: NOT AT ALL
SUM OF ALL RESPONSES TO PHQ9 QUESTIONS 1 AND 2: 0
1. LITTLE INTEREST OR PLEASURE IN DOING THINGS: NOT AT ALL

## 2024-07-29 ASSESSMENT — ACTIVITIES OF DAILY LIVING (ADL)
DOING_HOUSEWORK: INDEPENDENT
TAKING_MEDICATION: INDEPENDENT
DRESSING: INDEPENDENT
GROCERY_SHOPPING: INDEPENDENT
BATHING: INDEPENDENT
MANAGING_FINANCES: INDEPENDENT

## 2024-07-29 ASSESSMENT — ENCOUNTER SYMPTOMS
OCCASIONAL FEELINGS OF UNSTEADINESS: 0
LOSS OF SENSATION IN FEET: 0
DEPRESSION: 0

## 2024-07-29 NOTE — PROGRESS NOTES
Subjective   Patient ID: Ivette Tang is a 69 y.o. female who presents for Medicare Annual Wellness Visit Subsequent ([facility]).    Pt labs were better\  Chol is better  Taking berberine  Pt has not lost wt  Pt denies cp, sob,edema, dizziness  Pt denies mood changes  Pt injred her l knee and went to          Review of Systems   All other systems reviewed and are negative.      Objective   /78   Pulse 72   Ht 1.524 m (5')   Wt 73.9 kg (163 lb)   SpO2 98%   BMI 31.83 kg/m²     Physical Exam  Constitutional:       Appearance: Normal appearance.   HENT:      Head: Normocephalic and atraumatic.      Right Ear: Tympanic membrane, ear canal and external ear normal.      Left Ear: Tympanic membrane, ear canal and external ear normal.      Nose: Nose normal.      Mouth/Throat:      Mouth: Mucous membranes are moist.      Pharynx: Oropharynx is clear.   Eyes:      Extraocular Movements: Extraocular movements intact.      Conjunctiva/sclera: Conjunctivae normal.      Pupils: Pupils are equal, round, and reactive to light.   Cardiovascular:      Rate and Rhythm: Normal rate and regular rhythm.      Pulses: Normal pulses.      Heart sounds: Normal heart sounds.   Pulmonary:      Effort: Pulmonary effort is normal.      Breath sounds: Normal breath sounds.   Abdominal:      General: Abdomen is flat. Bowel sounds are normal.      Palpations: Abdomen is soft.   Musculoskeletal:         General: Normal range of motion.      Cervical back: Normal range of motion and neck supple.   Skin:     General: Skin is warm and dry.      Capillary Refill: Capillary refill takes less than 2 seconds.   Neurological:      General: No focal deficit present.      Mental Status: She is alert and oriented to person, place, and time.   Psychiatric:         Mood and Affect: Mood normal.         Behavior: Behavior normal.         Thought Content: Thought content normal.         Assessment/Plan   Diagnoses and all orders for this visit:  Medicare  annual wellness visit, subsequent  Spinal stenosis of lumbar region without neurogenic claudication  Combined hyperlipidemia  Sacroiliitis (CMS-[facility])  Osteoporosis, unspecified osteoporosis type, unspecified pathological fracture presence  Strain of left knee, initial encounter  -     Referral to Physical Therapy; Future  Routine general medical examination at health care facility Medicare Wellness Billing Compliance Satisfied    *This is a visual tool to show completion of required items on the day of the visit. Green checks will only appear on the date of visit.    Review all medications by prescribing practitioner or clinical pharmacist (such as prescriptions, OTCs, herbal therapies and supplements) documented in the medical record    Past Medical, Surgical, and Family History reviewed and updated in chart    Tobacco Use Reviewed    Alcohol Use Reviewed    Illicit Drug Use Reviewed    PHQ2/9    Falls in Last Year Reviewed    Home Safety Risk Factors Reviewed    Cognitive Impairment Reviewed    Patient Self Assessment and Health Status    Current Diet Reviewed    Exercise Frequency    ADL - Hearing Impairment    ADL - Bathing    ADL - Dressing    ADL - Walks in Home    IADL - Managing Finances    IADL - Grocery Shopping    IADL - Taking Medications    IADL - Doing Housework

## 2024-08-01 ENCOUNTER — HOSPITAL ENCOUNTER (OUTPATIENT)
Dept: RADIOLOGY | Facility: EXTERNAL LOCATION | Age: 69
Discharge: HOME | End: 2024-08-01

## 2024-08-01 DIAGNOSIS — M79.672 LEFT FOOT PAIN: ICD-10-CM

## 2024-08-01 DIAGNOSIS — M79.671 RIGHT FOOT PAIN: ICD-10-CM

## 2024-08-01 DIAGNOSIS — M25.571 ACUTE RIGHT ANKLE PAIN: ICD-10-CM

## 2024-08-02 ENCOUNTER — EVALUATION (OUTPATIENT)
Dept: PHYSICAL THERAPY | Facility: CLINIC | Age: 69
End: 2024-08-02
Payer: MEDICARE

## 2024-08-02 DIAGNOSIS — S86.912A STRAIN OF LEFT KNEE, INITIAL ENCOUNTER: ICD-10-CM

## 2024-08-02 PROCEDURE — 97110 THERAPEUTIC EXERCISES: CPT | Mod: GP

## 2024-08-02 ASSESSMENT — ENCOUNTER SYMPTOMS
LOSS OF SENSATION IN FEET: 0
DEPRESSION: 0
OCCASIONAL FEELINGS OF UNSTEADINESS: 1

## 2024-08-02 NOTE — PROGRESS NOTES
Physical Therapy Evaluation    Patient Name: Ivette Tang  MRN: [id number]  Today's Date: 08/02/24        Insurance:  Visit number: 1 of AUTH  Insurance Type: Payor: [employer] / Plan: [employer] / Product Type: *No Product type* /   Authorization or Plan of Care date Range:     Therapy diagnoses:   1. Strain of left knee, initial encounter  Referral to Physical Therapy             Precautions:  Right foot stress fracture, right distal fibula stress fracture, left lumbar convexity   Fall Risk: None    SUBJECTIVE:  Was pushing a lawnmower up hill. Hurt the left knee. Did have some discomfort to go to urgent care. Has reduced in the last month. Has feeling of tightness. During this same time also has had right foot stress fracture.   The pain does not hurt so much any longer it is more tight.   Fully bending the knee is uncomfortable.   Stairs are OK. Standing is more uncomfortable.   Not sleeping well but the knee is not waking her up.   Exercise - walking, rowing machine, biking.   Denies numbness tingling and radicular pain.     Pain:1-2/10    Home Living:  Multi level home     Prior level of function:  INDP     OBJECTIVE:    Knee AROM: (degrees) Left Right   Flexion 128 130   Extension 0 1       Knee Strength: MMT Left Right   Flexion 4-/5 4/5   Extension 4/5 4/5     Hip AROM WFL NEVA   HIP MMT  Hip flex 4/5 neva, abd 4/5 neva, add 4+/5 neva, ext 4/5 neva, IR ER 4-/5  ANKLE AROM WFL left, right in boot   DF PF right NT, left 4/5    Swelling/Girth: left kJL 39.7  Malleolus 23.9 right 24.3 right     Crepitus mild to moderate neva knees    Gait: ambulates thru right heel, off loading right LE, step to gait pattern, antalgic   Palpation: no pain on palpation left knee joint   Patellar mobility: WFL   Flexibility:   Hamstrings: WFL neva    Quadriceps: WFL neva    Hip Flexors: mild deficit neva   Left lumbar convexity on Xray    ASSESSMENT:  Pt with multiple issues effecting WB and walking. Pt with left knee  pain in the last few months with a sense of tightness. Pt has pain with prolonged standing. This is made more complicated with right foot stress fractures, use of a boot, and heel WB RLE. Pt will need LE strength interventions to improve LLE functional tolerance. Pt is highly reliant on LLE for standing. Pt has more discomfort in WB than at rest.   Pt presents with the following deficits/problems that indicate a skilled need for PT:   Strength, balance, WB tolerance, edema, pain   Level of Complexity: low    TREATMENT:  PROM left LE  Exercise x 10 per below   CP declined     PATIENT EDUCATION:  HEP  goals  safety  POC  interventions selected    Access Code: [email or web address]  URL: [email or web address]  Date: 08/02/2024  Prepared by: Chioma Aguirre    Exercises  - Supine Quad Set  - 1 x daily - 7 x weekly - 2 sets - 10 reps  - Small Range Straight Leg Raise  - 1 x daily - 7 x weekly - 2 sets - 10 reps  - Supine Bridge  - 1 x daily - 7 x weekly - 2 sets - 10 reps  - Supine Knee Extension Strengthening  - 1 x daily - 7 x weekly - 2 sets - 10 reps  - Clamshell  - 1 x daily - 7 x weekly - 2 sets - 10 reps  - Sidelying Hip Abduction  - 1 x daily - 7 x weekly - 2 sets - 10 reps  - Standing Hamstring Stretch with Step  - 2 x daily - 7 x weekly - 1 sets - 4 reps - 20 hold  - Standing Knee Flexion Stretch on Step  - 2 x daily - 7 x weekly - 1 sets - 4 reps - 20 hold    PLAN:   Pt to be seen 1x times per week for 4 weeks. Or 2 x over next 6 weeks   Pt POC to include:  Therapeutic EX, Therapeutic ACT, NMR, Self care, Manual therapy, Gait training, CP/MHP, Education      Rehab potential:  Good   Plan of care agreement  Patient   GOALS:  Active       PT Problem       PT Goal 1       Start:  08/02/24    Expected End:  09/16/24       1) Pain will be reduced to 0/10 at rest no more than 2/10 with activity.   2) Function will be increased to be able to complete all household activity, standing for IADL and walking for  exercise.   3) ROM will be increased to be WNL at ailyn knee pain free   4) Strength to be increased to be WNL at 4+/5 ailyn hip and knee muscles   5) Independent in Home Exercise Program  6) Independent return to daily work outs including walking in good technique   7) Outcome tool improvement to LEFS 70/80  8) ambulation non antalgically safely unrestricted distances.   9) SLS 10 sec ailyn NBOS 30 sec ailyn                Patient Stated Goal 1       Start:  08/02/24    Expected End:  09/16/24       Bend knee without pain

## 2024-08-07 ENCOUNTER — APPOINTMENT (OUTPATIENT)
Dept: PODIATRY | Facility: CLINIC | Age: 69
End: 2024-08-07
Payer: MEDICARE

## 2024-08-08 ENCOUNTER — APPOINTMENT (OUTPATIENT)
Dept: ORTHOPEDIC SURGERY | Facility: CLINIC | Age: 69
End: 2024-08-08
Payer: MEDICARE

## 2024-08-30 ENCOUNTER — TREATMENT (OUTPATIENT)
Dept: PHYSICAL THERAPY | Facility: CLINIC | Age: 69
End: 2024-08-30
Payer: MEDICARE

## 2024-08-30 DIAGNOSIS — S86.912A STRAIN OF LEFT KNEE, INITIAL ENCOUNTER: ICD-10-CM

## 2024-08-30 PROCEDURE — 97110 THERAPEUTIC EXERCISES: CPT | Mod: GP

## 2024-08-30 PROCEDURE — 97140 MANUAL THERAPY 1/> REGIONS: CPT | Mod: GP

## 2024-08-30 NOTE — PROGRESS NOTES
Physical Therapy Treatment    Patient Name: Ivette Tang  MRN: [id number]    Today's Date: 8/30/2024    Insurance:  Visit number: 2 of 6  Insurance Type: Payor: [employer] / Plan: [employer]VANT[employer] / Product Type: *No Product type* /   Authorization or Plan of Care date Range: DATES 9/30/24      Diagnosis:   1. Strain of left knee, initial encounter  Follow Up In Physical Therapy          Precautions:  Right foot stress fracture - in boot  Fall Risk: Low    SUBJECTIVE:  In a boot right foot/ankle. Wearing left shoe level up.   Left knee is better but not great. Feels tight. All over discomfort.       OBJECTIVE:  TTP medial knee and distal quad     TREATMENT:  - Therex:  Stair stretches - hip flex, hamstring, gastroc 3 x 20 sec ailyn     2 x 10:  LAQ 1.5#  SAQ 1.5#  Bridge with abduction green TB  S/l abduction   QS with SLR      - Manual Therapy:  STM left knee - medial aspect, quad tendon area     -   - Modalities:      CP x 10 min supine     ASSESSMENT:   Pt with fair tolerance of interventions today. Pt with soft tissue restriction medial quad and medial knee, tender to palpation. Mild relief with CP. Pt able to fabienne progression of LLE ex well. Proceeding with caution as she is in a boot on RLE with knee pain LLE. Want to ensure positive improvement and not impact her mobility. Pt with medial knee pain thru out but not worse than at start.     PLAN:    Progress as able to fabienne with pain and RLE boot.       Billing:     PT Therapeutic Procedures Time Entry  Manual Therapy Time Entry: 15  Therapeutic Exercise Time Entry: 30

## 2024-09-13 ENCOUNTER — TREATMENT (OUTPATIENT)
Dept: PHYSICAL THERAPY | Facility: CLINIC | Age: 69
End: 2024-09-13
Payer: MEDICARE

## 2024-09-13 DIAGNOSIS — S86.912A STRAIN OF LEFT KNEE, INITIAL ENCOUNTER: ICD-10-CM

## 2024-09-13 PROCEDURE — 97140 MANUAL THERAPY 1/> REGIONS: CPT | Mod: GP

## 2024-09-13 PROCEDURE — 97110 THERAPEUTIC EXERCISES: CPT | Mod: GP

## 2024-09-13 NOTE — PROGRESS NOTES
Physical Therapy Treatment    Patient Name: Ivette Tang  MRN: [id number]    Today's Date: 9/13/2024    Insurance:  Visit number: 3 of 6  Insurance Type: Payor: [employer] / Plan: [employer]VANTAGE / Product Type: *No Product type* /   Authorization or Plan of Care date Range: DATES 9/30/24      Diagnosis:   1. Strain of left knee, initial encounter  Follow Up In Physical Therapy          Precautions:  Right foot stress fracture - in boot  Fall Risk: Low    SUBJECTIVE:    Sore in the inside of the knee. Continues to be sore to the touch or with prolonged bending.       OBJECTIVE:  TTP medial knee and distal quad   Ambulation with mild antalgia RLE due to foot     TREATMENT:  - Therex:  Stair stretches - hip flex, hamstring, gastroc 3 x 20 sec ailyn     3 x 10:  LAQ 1.5#  SAQ 1.5#  Bridge with abduction green TB  S/l abduction 1.5#  QS with SLR      - Manual Therapy:  STM left knee - medial aspect, quad tendon area     -   - Modalities:      CP x 10 min supine     ASSESSMENT:   Increased reps and resistance. Dianne of program well. Medial knee pain to palpation. Pt off loading RLE. Pt unable to progress in WB due to fracture of R foot. Relief with manual therapy to pain. Progressing.       PLAN:    Progress as able to dianne with pain and RLE boot.       Billing:     PT Therapeutic Procedures Time Entry  Manual Therapy Time Entry: 10  Therapeutic Exercise Time Entry: 30

## 2024-09-19 ENCOUNTER — TREATMENT (OUTPATIENT)
Dept: PHYSICAL THERAPY | Facility: CLINIC | Age: 69
End: 2024-09-19
Payer: MEDICARE

## 2024-09-19 DIAGNOSIS — S86.912A STRAIN OF LEFT KNEE, INITIAL ENCOUNTER: ICD-10-CM

## 2024-09-19 PROCEDURE — 97110 THERAPEUTIC EXERCISES: CPT | Mod: GP

## 2024-09-19 NOTE — PROGRESS NOTES
Physical Therapy Treatment    Patient Name: Ivette Tang  MRN: [id number]    Today's Date: 9/19/2024    Insurance:  Visit number: 3 of 6  Insurance Type: Payor: [employer] / Plan: [employer]VANTAGE / Product Type: *No Product type* /   Authorization or Plan of Care date Range: DATES 9/30/24      Diagnosis:   1. Strain of left knee, initial encounter  Follow Up In Physical Therapy          Precautions:  Right foot stress fracture - in boot  Fall Risk: Low    SUBJECTIVE:  Knee continues to be sore. Foot is stiff, carbon plate in shoe.       OBJECTIVE:  TTP medial knee and distal quad   Ambulation with mild antalgia RLE due to foot     TREATMENT:  - Therex:  Stair stretches - hip flex, hamstring, gastroc 3 x 20 sec ailyn   Bike level 2 5 in.     NBOS 3 x 1 min ailyn   Hip abd extension x 15 ailyn   Step up x 12 ailyn 4 inch     3 x 10:  LAQ 1.5#  SAQ 2#  Bridge with abduction green TB  S/l abduction 1.5#  QS with SLR  Supine green TB supine clam   S/l clam       - Manual Therapy:    -   - Modalities:      CP x 10 min supine     ASSESSMENT:   Pt fabienne well with challenge on balance activity. Educated in LE benefits of balance and stability training. Click noted with LAQ left. Pt with improved fabienne of LE strength activities with focus on technique. Additions fabienne well.     PLAN:    Progress as able to fabienne with pain and RLE boot.       Billing:     PT Therapeutic Procedures Time Entry  Therapeutic Exercise Time Entry: 45

## 2024-09-23 ENCOUNTER — TREATMENT (OUTPATIENT)
Dept: PHYSICAL THERAPY | Facility: CLINIC | Age: 69
End: 2024-09-23
Payer: MEDICARE

## 2024-09-23 DIAGNOSIS — S86.912A STRAIN OF LEFT KNEE, INITIAL ENCOUNTER: ICD-10-CM

## 2024-09-23 PROCEDURE — 97110 THERAPEUTIC EXERCISES: CPT | Mod: GP

## 2024-09-23 NOTE — PROGRESS NOTES
Physical Therapy Treatment    Patient Name: Ivette Tang  MRN: [id number]    Today's Date: 9/23/2024    Insurance:  Visit number: 4 of 6  Insurance Type: Payor: [employer] / Plan: [employer] ADVANTAGE / Product Type: *No Product type* /   Authorization or Plan of Care date Range: DATES 9/30/24      Diagnosis:   1. Strain of left knee, initial encounter  Follow Up In Physical Therapy          Precautions:  Right foot stress fracture - in boot  Fall Risk: Low    SUBJECTIVE:  Had MD appt foot is healing well.   Knee is sore but not as bad.     HEP compliance - yes     OBJECTIVE:    Reduced fabienne of WB activity due to foot       TREATMENT:  - Therex:  Stair stretches - hip flex, hamstring, gastroc 3 x 20 sec ailyn   Bike level 2 5 in.     TG 2 x 15 stop at 6     NBOS 3 x 1 min ailyn   Hip abd extension x 15 ailyn   Step up x 12 ailyn 4 inch     3 x 10:  LAQ 1.5#  SAQ 2#  Bridge with abduction green TB  S/l abduction 1.5#  QS with SLR  Supine green TB supine clam   S/l clam       - Manual Therapy:    -   - Modalities:      CP x 10 min supine     ASSESSMENT:  Pt fabienne well with no reports of pain during supine exercise. Limited WB ex due to foot discomfort. Pt challenged with NBOS activity. Modified WB leg press fabienne with ailyn knee awareness at the patella. Pt with improved activity tolerance and progression of activity. Will be able to integrate more as foot heals.       PLAN:     Progress into dynamic balance and standing strength as able.     Billing:     PT Therapeutic Procedures Time Entry  Therapeutic Exercise Time Entry: 45

## 2024-09-25 ENCOUNTER — HOSPITAL ENCOUNTER (OUTPATIENT)
Dept: RADIOLOGY | Facility: CLINIC | Age: 69
Discharge: HOME | End: 2024-09-25
Payer: MEDICARE

## 2024-09-25 ENCOUNTER — APPOINTMENT (OUTPATIENT)
Dept: PRIMARY CARE | Facility: CLINIC | Age: 69
End: 2024-09-25
Payer: MEDICARE

## 2024-09-25 VITALS
DIASTOLIC BLOOD PRESSURE: 84 MMHG | HEART RATE: 75 BPM | OXYGEN SATURATION: 98 % | WEIGHT: 162 LBS | BODY MASS INDEX: 31.64 KG/M2 | SYSTOLIC BLOOD PRESSURE: 126 MMHG

## 2024-09-25 DIAGNOSIS — M81.0 OSTEOPOROSIS, UNSPECIFIED OSTEOPOROSIS TYPE, UNSPECIFIED PATHOLOGICAL FRACTURE PRESENCE: ICD-10-CM

## 2024-09-25 DIAGNOSIS — S86.912D STRAIN OF LEFT KNEE, SUBSEQUENT ENCOUNTER: ICD-10-CM

## 2024-09-25 DIAGNOSIS — S86.912D STRAIN OF LEFT KNEE, SUBSEQUENT ENCOUNTER: Primary | ICD-10-CM

## 2024-09-25 PROCEDURE — 1160F RVW MEDS BY RX/DR IN RCRD: CPT | Performed by: FAMILY MEDICINE

## 2024-09-25 PROCEDURE — 99213 OFFICE O/P EST LOW 20 MIN: CPT | Performed by: FAMILY MEDICINE

## 2024-09-25 PROCEDURE — 90662 IIV NO PRSV INCREASED AG IM: CPT | Performed by: FAMILY MEDICINE

## 2024-09-25 PROCEDURE — 1036F TOBACCO NON-USER: CPT | Performed by: FAMILY MEDICINE

## 2024-09-25 PROCEDURE — G0008 ADMIN INFLUENZA VIRUS VAC: HCPCS | Performed by: FAMILY MEDICINE

## 2024-09-25 PROCEDURE — 1159F MED LIST DOCD IN RCRD: CPT | Performed by: FAMILY MEDICINE

## 2024-09-25 PROCEDURE — 73562 X-RAY EXAM OF KNEE 3: CPT | Mod: LT

## 2024-09-25 PROCEDURE — 73562 X-RAY EXAM OF KNEE 3: CPT | Mod: LEFT SIDE | Performed by: RADIOLOGY

## 2024-09-25 RX ORDER — CELECOXIB 200 MG/1
200 CAPSULE ORAL 2 TIMES DAILY
Qty: 60 CAPSULE | Refills: 5 | Status: SHIPPED | OUTPATIENT
Start: 2024-09-25 | End: 2025-03-24

## 2024-09-25 ASSESSMENT — ENCOUNTER SYMPTOMS
LOSS OF SENSATION IN FEET: 0
OCCASIONAL FEELINGS OF UNSTEADINESS: 0
ARTHRALGIAS: 1
DEPRESSION: 0
JOINT SWELLING: 1

## 2024-09-25 NOTE — PROGRESS NOTES
Subjective   Patient ID: Ivette Tang is a 69 y.o. female who presents for Follow-up.    HPI   Pt has had l knee pain  Doing 75 % better  Pt  did do PT  Has pain medially  Review of Systems   Musculoskeletal:  Positive for arthralgias and joint swelling.   All other systems reviewed and are negative.      Objective   /84   Pulse 75   Wt 73.5 kg (162 lb)   SpO2 98%   BMI 31.64 kg/m²     Physical Exam  Constitutional:       Appearance: Normal appearance.   HENT:      Head: Normocephalic and atraumatic.      Right Ear: Tympanic membrane, ear canal and external ear normal.      Left Ear: Tympanic membrane, ear canal and external ear normal.      Nose: Nose normal.      Mouth/Throat:      Mouth: Mucous membranes are moist.      Pharynx: Oropharynx is clear.   Eyes:      Extraocular Movements: Extraocular movements intact.      Conjunctiva/sclera: Conjunctivae normal.      Pupils: Pupils are equal, round, and reactive to light.   Cardiovascular:      Rate and Rhythm: Normal rate and regular rhythm.      Pulses: Normal pulses.      Heart sounds: Normal heart sounds.   Pulmonary:      Effort: Pulmonary effort is normal.      Breath sounds: Normal breath sounds.   Abdominal:      General: Abdomen is flat. Bowel sounds are normal.      Palpations: Abdomen is soft.   Musculoskeletal:         General: Tenderness present. Normal range of motion.      Cervical back: Normal range of motion and neck supple.   Skin:     General: Skin is warm and dry.      Capillary Refill: Capillary refill takes less than 2 seconds.   Neurological:      General: No focal deficit present.      Mental Status: She is alert and oriented to person, place, and time.   Psychiatric:         Mood and Affect: Mood normal.         Behavior: Behavior normal.         Thought Content: Thought content normal.         Assessment/Plan   Diagnoses and all orders for this visit:  Strain of left knee, subsequent encounter  -     XR knee left 1-2 views;  Future  -     celecoxib (CeleBREX) 200 mg capsule; Take 1 capsule (200 mg) by mouth 2 times a day.  -     Referral to Orthopaedic Surgery; Future  Other orders  -     Flu vaccine, trivalent, preservative free, HIGH-DOSE, age 65y+ (Fluzone)

## 2024-09-26 ENCOUNTER — HOSPITAL ENCOUNTER (OUTPATIENT)
Dept: RADIOLOGY | Facility: CLINIC | Age: 69
Discharge: HOME | End: 2024-09-26
Payer: MEDICARE

## 2024-09-26 DIAGNOSIS — M81.0 OSTEOPOROSIS, UNSPECIFIED OSTEOPOROSIS TYPE, UNSPECIFIED PATHOLOGICAL FRACTURE PRESENCE: ICD-10-CM

## 2024-09-26 PROCEDURE — 77080 DXA BONE DENSITY AXIAL: CPT

## 2024-09-30 ENCOUNTER — TREATMENT (OUTPATIENT)
Dept: PHYSICAL THERAPY | Facility: CLINIC | Age: 69
End: 2024-09-30
Payer: MEDICARE

## 2024-09-30 DIAGNOSIS — S86.912A STRAIN OF LEFT KNEE, INITIAL ENCOUNTER: Primary | ICD-10-CM

## 2024-09-30 PROCEDURE — 97110 THERAPEUTIC EXERCISES: CPT | Mod: GP,CQ | Performed by: PHYSICAL THERAPY ASSISTANT

## 2024-09-30 NOTE — PROGRESS NOTES
Physical Therapy Treatment    Patient Name: Ivette Tang  MRN: [id number]    Today's Date: 9/30/2024    Insurance:  Visit number: 6 of 6  Insurance Type: Payor: [employer] / Plan: [employer]VANTAGE / Product Type: *No Product type* /   Authorization or Plan of Care date Range: DATES 9/30/24      Diagnosis:   1. Strain of left knee, initial encounter  Follow Up In Physical Therapy            Precautions:  Right foot stress fracture - in boot  Fall Risk: Low    SUBJECTIVE:  Weaned from boot since last week Monday.     She states overall her knee has been feeling better. She says things like laying down at night can effect it being and standing for longer periods. As of now pain grade gets at most 3/10 grade level.    HEP compliance - yes     OBJECTIVE:  L knee MMT  Flexn 5/5  Ext  4+/5    L hip MMT  Flex 4/5  Ext 4/5  Abd 4/5    L knee ROM  WNL with some crepitus     KOS   27/28=96%      TREATMENT:  - Therex:  Bike 5', L5  Stair stretches - hip flex, hamstring, gastroc 3 x 20 sec ailyn      TG 3x10, L7 stop at 6   Hip machine (2) abd/ext x15ea R/L, TKE's (2) 2x10  Leg Curls 20# 2x10  Leg ext 10# 2x10  Leg Press 40# 2x10    ASSESSMENT:  Pt's last visit. Performed exercises well. Pain unremarkable with session. Went through a series of machines she can use at her local rec center to transition to her own program. Encouraged to cont LE strengthening for knee support. She appeared pleased with her progressions in PT and happy with her outcome and she understands it will be on her to maximize what she can out of exercises. Very good effort and compliance.     PLAN:   Refer back to [facility] for DC.     Billing:     PT Therapeutic Procedures Time Entry  Therapeutic Exercise Time Entry: 42

## 2024-10-07 ENCOUNTER — APPOINTMENT (OUTPATIENT)
Dept: ORTHOPEDIC SURGERY | Facility: CLINIC | Age: 69
End: 2024-10-07
Payer: MEDICARE

## 2024-10-07 ENCOUNTER — HOSPITAL ENCOUNTER (OUTPATIENT)
Dept: RADIOLOGY | Facility: CLINIC | Age: 69
Discharge: HOME | End: 2024-10-07
Payer: MEDICARE

## 2024-10-07 VITALS — WEIGHT: 164 LBS | HEIGHT: 60 IN | BODY MASS INDEX: 32.2 KG/M2

## 2024-10-07 DIAGNOSIS — S86.912A STRAIN OF LEFT KNEE, INITIAL ENCOUNTER: ICD-10-CM

## 2024-10-07 DIAGNOSIS — S86.912D STRAIN OF LEFT KNEE, SUBSEQUENT ENCOUNTER: ICD-10-CM

## 2024-10-07 DIAGNOSIS — M17.12 PRIMARY OSTEOARTHRITIS OF LEFT KNEE: Primary | ICD-10-CM

## 2024-10-07 PROCEDURE — 3008F BODY MASS INDEX DOCD: CPT | Performed by: ORTHOPAEDIC SURGERY

## 2024-10-07 PROCEDURE — 99204 OFFICE O/P NEW MOD 45 MIN: CPT | Performed by: ORTHOPAEDIC SURGERY

## 2024-10-07 PROCEDURE — 73560 X-RAY EXAM OF KNEE 1 OR 2: CPT | Mod: LEFT SIDE | Performed by: RADIOLOGY

## 2024-10-07 PROCEDURE — 20610 DRAIN/INJ JOINT/BURSA W/O US: CPT | Performed by: ORTHOPAEDIC SURGERY

## 2024-10-07 PROCEDURE — 73560 X-RAY EXAM OF KNEE 1 OR 2: CPT | Mod: LT

## 2024-10-07 PROCEDURE — 1125F AMNT PAIN NOTED PAIN PRSNT: CPT | Performed by: ORTHOPAEDIC SURGERY

## 2024-10-07 PROCEDURE — 1159F MED LIST DOCD IN RCRD: CPT | Performed by: ORTHOPAEDIC SURGERY

## 2024-10-07 RX ORDER — TRIAMCINOLONE ACETONIDE 40 MG/ML
80 INJECTION, SUSPENSION INTRA-ARTICULAR; INTRAMUSCULAR
Status: COMPLETED | OUTPATIENT
Start: 2024-10-07 | End: 2024-10-07

## 2024-10-07 ASSESSMENT — PAIN - FUNCTIONAL ASSESSMENT: PAIN_FUNCTIONAL_ASSESSMENT: 0-10

## 2024-10-07 ASSESSMENT — PAIN SCALES - GENERAL: PAINLEVEL_OUTOF10: 1

## 2024-10-07 NOTE — PROGRESS NOTES
PRIMARY CARE PHYSICIAN: Olga Baptiste DO  REFERRING PROVIDER: Olga Baptiste DO  [address] [facility], [address]  [place] [address]     CONSULT ORTHOPAEDIC: Knee Evaluation        ASSESSMENT & PLAN    IMPRESSION:   1.  Primary arthritis, left knee    PLAN:    discussed with patient findings above.  Reviewed her current x-rays with her.  She is currently failing to respond to treatment for arthritis in her knee and this point has tried most conservative treatment short of a corticosteroid injection.  I did discuss that lower extremity conditioning strength would be continued to help her as she was instructed in physical therapy but we may try corticosteroid injection or need to see if this helps her symptoms.  Patient like to agree and proceed with an injection and may repeat this every 3 to 4 months.  See below for injection details.  Will follow-up as needed otherwise.    L Inj/Asp: L knee on 10/7/2024 9:04 AM  Indications: pain  Details: 25 G needle, anterolateral (Inferolateral) approach  Medications: 80 mg triamcinolone acetonide 40 mg/mL  Outcome: tolerated well, no immediate complications    Prepped with alcohol. Patient tolerated injection well. Band-aid applied to injection site.   Procedure, treatment alternatives, risks and benefits explained, specific risks discussed. Consent was given by the patient. Immediately prior to procedure a time out was called to verify the correct patient, procedure, equipment, support staff and site/side marked as required.             SUBJECTIVE  CHIEF COMPLAINT:   Chief Complaint   Patient presents with    Left Knee - Pain        HPI: Ivette Tang is a 69 y.o. patient. Ivette Tang has had progressive problems with their left knee over the past 4 months. They do not report any trauma, but states it started after pushing a  up a hill and had pain that night. They do not report any constant or progressive numbness or tingling in their  legs. Their symptoms are interfering with activities which include pain on the medial side and behind her knee.  She continues to have pain now.  Has a regular exercise program with walking, lester, golf.    FUNCTIONAL STATUS: not limited.  AMBULATORY STATUS:  independent  PREVIOUS TREATMENTS: NSAIDS Ibuprofen and then Celebrex  with mild improvement  Therapy for about 6 weeks with mild improvement  She also tried a Medrol dose pack which helped only while she was taking it.   HISTORY OF SURGERY ON AFFECTED KNEE(S): No       REVIEW OF SYSTEMS  Constitutional: See HPI for pain assessment, No significant weight loss, recent trauma  Cardiovascular: No chest pain, shortness of breath  Respiratory: No difficulty breathing, cough  Gastrointestinal: No nausea, vomiting, diarrhea, constipation  Musculoskeletal: Noted in HPI, positive for pain, restricted motion, stiffness  Integumentary: No rashes, easy bruising, redness   Neurological: no numbness or tingling in extremities, no gait disturbances   Psychiatric: No mood changes, memory changes, social issues  Heme/Lymph: no excessive swelling, easy bruising, excessive bleeding  ENT: No hearing changes  Eyes: No vision changes    Past Medical History:   Diagnosis Date    Hypertrophy of breast 10/31/2011    Osteopetrosis ([facility])     Osteopetrosis    Personal history of other diseases of the female genital tract     Vaginal delivery    Personal history of other diseases of the nervous system and sense organs     History of glaucoma    Pure hypercholesterolemia, unspecified     Elevated cholesterol        Allergies   Allergen Reactions    Nickel Itching    Penicillins Unknown    Sulfa (Sulfonamide Antibiotics) Unknown        Past Surgical History:   Procedure Laterality Date    BREAST SURGERY Bilateral     Breast Reduction Procedure    HYSTEROSCOPY  11/13/2014    Hysteroscopy With Endometrial Ablation    TONSILLECTOMY  11/13/2014    Tonsillectomy        Family History   Problem  Relation Name Age of Onset    Anxiety disorder Mother      Clotting disorder Mother      Depression Mother      Parkinsonism Mother      Osteoporosis Mother      Heart attack Father      Stroke Father      Breast cancer Neg Hx          Social History     Socioeconomic History    Marital status:      Spouse name: Not on file    Number of children: Not on file    Years of education: Not on file    Highest education level: Not on file   Occupational History    Not on file   Tobacco Use    Smoking status: Never    Smokeless tobacco: Never   Substance and Sexual Activity    Alcohol use: Not Currently    Drug use: Not Currently    Sexual activity: Defer   Other Topics Concern    Not on file   Social History Narrative    Not on file     Social Determinants of Health     Financial Resource Strain: Not on file   Food Insecurity: Not on file   Transportation Needs: Not on file   Physical Activity: Not on file   Stress: Not on file   Social Connections: Not on file   Intimate Partner Violence: Unknown (7/3/2021)    [facility]    Intimate Partner Violence     Within the last year, have you been afraid of your partner, ex-partner or family member?: Not on file     Within the last year, have you been humiliated or emotionally abused in other ways by your partner, ex-partner or family member?: Not on file     Within the last year, have you been kicked, hit, slapped, or otherwise physically hurt by your partner, ex-partner or family member?: Not on file     Within the last year, have you been raped or forced to have any kind of sexual activity by your partner, ex-partner or family member?: Not on file   Housing Stability: Not on file        CURRENT MEDICATIONS:   Current Outpatient Medications   Medication Sig Dispense Refill    CALCIUM ORAL (600-200 MG-UNIT TABS)      celecoxib (CeleBREX) 200 mg capsule Take 1 capsule (200 mg) by mouth 2 times a day. 60 capsule 5     denosumab (Prolia) 60 mg/mL syringe Inject 1 mL (60 mg total) under the skin every 6 months. 1 each 1    ergocalciferol (Vitamin D-2) 1.25 MG (95510 UT) capsule Take 1 capsule (50,000 Units) by mouth once a week.      fexofenadine (Allegra) 180 mg tablet Take by mouth.      latanoprost (Xalatan) 0.005 % ophthalmic solution Administer into affected eye(s).      multivitamin tablet Take by mouth.      NON FORMULARY Vitamin D 1000 UNIT TABS      RED YEAST RICE ORAL Take by mouth.      terbinafine (LamISIL) 250 mg tablet Take 1 tablet (250 mg) by mouth once daily.      tretinoin (Retin-A) 0.025 % cream Apply pea sized amount to face nightly       Current Facility-Administered Medications   Medication Dose Route Frequency Provider Last Rate Last Admin    denosumab (Prolia) injection 60 mg  60 mg subcutaneous q6 months Olga Baptiste DO            OBJECTIVE    PHYSICAL EXAM      3/5/2024     3:02 PM 4/25/2024    11:56 AM 6/8/2024     4:16 PM 6/10/2024     8:09 AM 7/29/2024     9:56 AM 8/1/2024     3:33 PM 9/25/2024     9:59 AM   Vitals   Systolic  124 146 153 118 144 126   Diastolic  80 83 79 78 83 84   Heart Rate  80 72  72 76 75   Temp   36.6 °C (97.8 °F)   36.8 °C (98.2 °F)    Resp   14   18    Height (in) 1.524 m (5')    1.524 m (5')     Weight (lb) 154.98 162  162 163  162   BMI 30.27 kg/m2 31.64 kg/m2  31.64 kg/m2 31.83 kg/m2  31.64 kg/m2   BSA (m2) 1.73 m2 1.76 m2  1.76 m2 1.77 m2  1.76 m2   Visit Report  Report  Report Report  Report      There is no height or weight on file to calculate BMI.  GENERAL: A/Ox3, NAD. Appears healthy, well nourished  PSYCHIATRIC: Mood stable, appropriate memory recall  EYES: EOM intact, no scleral icterus  CARDIAC: regular rate  LUNGS: Breathing non-labored  SKIN: no erythema, rashes, or ecchymoses     MUSCULOSKELETAL:  Laterality: left Knee Exam  - Alignment: Fully correctible varus deformity  - ROM:  0 - >130deg  - Effusion: none  - Strength: knee extension and flexion 5/5,  EHL/PF/DF motor intact  - Palpation: TTP along medial joint line.  No tenderness to palpation along bilateral patellar facets or lateral joint line  - Stability: Anterior/Posterior stable, varus/valgus stable  - Gait: normal  - Hip Exam: flexion to 100+ degrees, full extension, internal/external rotation adequate, and no pain with log roll  - Special Tests: No pain with patellar compression, negative Lachman, negative posterior drawer.  Negative pivot shift    NEUROVASCULAR:  - Neurovascular Status: sensation intact to light touch distally  - Capillary refill brisk at extremities, Bilateral dorsalis pedis pulse 2+     IMAGING:  Multiple views of the affected left knee(s) demonstrate: Moderate arthritic changes confined to the medial compartment joint space narrowing and subchondral sclerosis.   X-rays were personally reviewed and interpreted by me.  Radiology reports were reviewed by me as well, if readily available at the time.        Karina Luis DO  Attending Surgeon  Joint Replacement and Adult Reconstructive Surgery  [place], [place]

## 2024-10-07 NOTE — LETTER
October 7, 2024     Olga Baptiste DO  [address] [facility], [address] [address]    Patient: Ivette Tang   YOB: 1955   Date of Visit: 10/7/2024       Dear Dr. Olga Baptiste DO:    Thank you for referring Ivette Tang to me for evaluation. Below are my notes for this consultation.  If you have questions, please do not hesitate to call me. I look forward to following your patient along with you.       Sincerely,     Karina Luis DO      CC: No Recipients  ______________________________________________________________________________________    PRIMARY CARE PHYSICIAN: Olga Baptiste DO  REFERRING PROVIDER: Olga Baptiste DO  [address] [facility], [address] [address]     CONSULT ORTHOPAEDIC: Knee Evaluation        ASSESSMENT & PLAN    IMPRESSION:   1.  Primary arthritis, left knee    PLAN:    discussed with patient findings above.  Reviewed her current x-rays with her.  She is currently failing to respond to treatment for arthritis in her knee and this point has tried most conservative treatment short of a corticosteroid injection.  I did discuss that lower extremity conditioning strength would be continued to help her as she was instructed in physical therapy but we may try corticosteroid injection or need to see if this helps her symptoms.  Patient like to agree and proceed with an injection and may repeat this every 3 to 4 months.  See below for injection details.  Will follow-up as needed otherwise.    L Inj/Asp: L knee on 10/7/2024 9:04 AM  Indications: pain  Details: 25 G needle, anterolateral (Inferolateral) approach  Medications: 80 mg triamcinolone acetonide 40 mg/mL  Outcome: tolerated well, no immediate complications    Prepped with alcohol. Patient tolerated injection well. Band-aid applied to injection site.   Procedure, treatment alternatives, risks and benefits explained, specific risks discussed. Consent was  given by the patient. Immediately prior to procedure a time out was called to verify the correct patient, procedure, equipment, support staff and site/side marked as required.             SUBJECTIVE  CHIEF COMPLAINT:   Chief Complaint   Patient presents with   • Left Knee - Pain        HPI: Ivette Tang is a 69 y.o. patient. Ivette Tang has had progressive problems with their left knee over the past 4 months. They do not report any trauma, but states it started after pushing a  up a hill and had pain that night. They do not report any constant or progressive numbness or tingling in their legs. Their symptoms are interfering with activities which include pain on the medial side and behind her knee.  She continues to have pain now.  Has a regular exercise program with walking, lester, golf.    FUNCTIONAL STATUS: not limited.  AMBULATORY STATUS:  independent  PREVIOUS TREATMENTS: NSAIDS Ibuprofen and then Celebrex  with mild improvement  Therapy for about 6 weeks with mild improvement  She also tried a Medrol dose pack which helped only while she was taking it.   HISTORY OF SURGERY ON AFFECTED KNEE(S): No       REVIEW OF SYSTEMS  Constitutional: See HPI for pain assessment, No significant weight loss, recent trauma  Cardiovascular: No chest pain, shortness of breath  Respiratory: No difficulty breathing, cough  Gastrointestinal: No nausea, vomiting, diarrhea, constipation  Musculoskeletal: Noted in HPI, positive for pain, restricted motion, stiffness  Integumentary: No rashes, easy bruising, redness   Neurological: no numbness or tingling in extremities, no gait disturbances   Psychiatric: No mood changes, memory changes, social issues  Heme/Lymph: no excessive swelling, easy bruising, excessive bleeding  ENT: No hearing changes  Eyes: No vision changes    Past Medical History:   Diagnosis Date   • Hypertrophy of breast 10/31/2011   • Osteopetrosis ([facility]-HCC)     Osteopetrosis   • Personal history of other  diseases of the female genital tract     Vaginal delivery   • Personal history of other diseases of the nervous system and sense organs     History of glaucoma   • Pure hypercholesterolemia, unspecified     Elevated cholesterol        Allergies   Allergen Reactions   • Nickel Itching   • Penicillins Unknown   • Sulfa (Sulfonamide Antibiotics) Unknown        Past Surgical History:   Procedure Laterality Date   • BREAST SURGERY Bilateral     Breast Reduction Procedure   • HYSTEROSCOPY  11/13/2014    Hysteroscopy With Endometrial Ablation   • TONSILLECTOMY  11/13/2014    Tonsillectomy        Family History   Problem Relation Name Age of Onset   • Anxiety disorder Mother     • Clotting disorder Mother     • Depression Mother     • Parkinsonism Mother     • Osteoporosis Mother     • Heart attack Father     • Stroke Father     • Breast cancer Neg Hx          Social History     Socioeconomic History   • Marital status:      Spouse name: Not on file   • Number of children: Not on file   • Years of education: Not on file   • Highest education level: Not on file   Occupational History   • Not on file   Tobacco Use   • Smoking status: Never   • Smokeless tobacco: Never   Substance and Sexual Activity   • Alcohol use: Not Currently   • Drug use: Not Currently   • Sexual activity: Defer   Other Topics Concern   • Not on file   Social History Narrative   • Not on file     Social Determinants of Health     Financial Resource Strain: Not on file   Food Insecurity: Not on file   Transportation Needs: Not on file   Physical Activity: Not on file   Stress: Not on file   Social Connections: Not on file   Intimate Partner Violence: Unknown (7/3/2021)    [facility]    Intimate Partner Violence    • Within the last year, have you been afraid of your partner, ex-partner or family member?: Not on file    • Within the last year, have you been humiliated or emotionally abused in  other ways by your partner, ex-partner or family member?: Not on file    • Within the last year, have you been kicked, hit, slapped, or otherwise physically hurt by your partner, ex-partner or family member?: Not on file    • Within the last year, have you been raped or forced to have any kind of sexual activity by your partner, ex-partner or family member?: Not on file   Housing Stability: Not on file        CURRENT MEDICATIONS:   Current Outpatient Medications   Medication Sig Dispense Refill   • CALCIUM ORAL (600-200 MG-UNIT TABS)     • celecoxib (CeleBREX) 200 mg capsule Take 1 capsule (200 mg) by mouth 2 times a day. 60 capsule 5   • denosumab (Prolia) 60 mg/mL syringe Inject 1 mL (60 mg total) under the skin every 6 months. 1 each 1   • ergocalciferol (Vitamin D-2) 1.25 MG (24109 UT) capsule Take 1 capsule (50,000 Units) by mouth once a week.     • fexofenadine (Allegra) 180 mg tablet Take by mouth.     • latanoprost (Xalatan) 0.005 % ophthalmic solution Administer into affected eye(s).     • multivitamin tablet Take by mouth.     • NON FORMULARY Vitamin D 1000 UNIT TABS     • RED YEAST RICE ORAL Take by mouth.     • terbinafine (LamISIL) 250 mg tablet Take 1 tablet (250 mg) by mouth once daily.     • tretinoin (Retin-A) 0.025 % cream Apply pea sized amount to face nightly       Current Facility-Administered Medications   Medication Dose Route Frequency Provider Last Rate Last Admin   • denosumab (Prolia) injection 60 mg  60 mg subcutaneous q6 months Olga Baptiste,             OBJECTIVE    PHYSICAL EXAM      3/5/2024     3:02 PM 4/25/2024    11:56 AM 6/8/2024     4:16 PM 6/10/2024     8:09 AM 7/29/2024     9:56 AM 8/1/2024     3:33 PM 9/25/2024     9:59 AM   Vitals   Systolic  124 146 153 118 144 126   Diastolic  80 83 79 78 83 84   Heart Rate  80 72  72 76 75   Temp   36.6 °C (97.8 °F)   36.8 °C (98.2 °F)    Resp   14   18    Height (in) 1.524 m (5')    1.524 m (5')     Weight (lb) 154.98 162  162 163  162    BMI 30.27 kg/m2 31.64 kg/m2  31.64 kg/m2 31.83 kg/m2  31.64 kg/m2   BSA (m2) 1.73 m2 1.76 m2  1.76 m2 1.77 m2  1.76 m2   Visit Report  Report  Report Report  Report      There is no height or weight on file to calculate BMI.  GENERAL: A/Ox3, NAD. Appears healthy, well nourished  PSYCHIATRIC: Mood stable, appropriate memory recall  EYES: EOM intact, no scleral icterus  CARDIAC: regular rate  LUNGS: Breathing non-labored  SKIN: no erythema, rashes, or ecchymoses     MUSCULOSKELETAL:  Laterality: left Knee Exam  - Alignment: Fully correctible varus deformity  - ROM:  0 - >130deg  - Effusion: none  - Strength: knee extension and flexion 5/5, EHL/PF/DF motor intact  - Palpation: TTP along medial joint line.  No tenderness to palpation along bilateral patellar facets or lateral joint line  - Stability: Anterior/Posterior stable, varus/valgus stable  - Gait: normal  - Hip Exam: flexion to 100+ degrees, full extension, internal/external rotation adequate, and no pain with log roll  - Special Tests: No pain with patellar compression, negative Lachman, negative posterior drawer.  Negative pivot shift    NEUROVASCULAR:  - Neurovascular Status: sensation intact to light touch distally  - Capillary refill brisk at extremities, Bilateral dorsalis pedis pulse 2+     IMAGING:  Multiple views of the affected left knee(s) demonstrate: Moderate arthritic changes confined to the medial compartment joint space narrowing and subchondral sclerosis.   X-rays were personally reviewed and interpreted by me.  Radiology reports were reviewed by me as well, if readily available at the time.        Karina Luis DO  Attending Surgeon  Joint Replacement and Adult Reconstructive Surgery  [place], [place]

## 2024-10-28 DIAGNOSIS — M81.0 OSTEOPOROSIS, UNSPECIFIED OSTEOPOROSIS TYPE, UNSPECIFIED PATHOLOGICAL FRACTURE PRESENCE: ICD-10-CM

## 2024-10-28 RX ORDER — DENOSUMAB 60 MG/ML
60 INJECTION SUBCUTANEOUS
Qty: 1 EACH | Refills: 0 | Status: SHIPPED | OUTPATIENT
Start: 2024-10-28 | End: 2024-10-30 | Stop reason: SDUPTHER

## 2024-10-30 DIAGNOSIS — M81.0 OSTEOPOROSIS, UNSPECIFIED OSTEOPOROSIS TYPE, UNSPECIFIED PATHOLOGICAL FRACTURE PRESENCE: ICD-10-CM

## 2024-10-30 RX ORDER — DENOSUMAB 60 MG/ML
60 INJECTION SUBCUTANEOUS
Qty: 1 EACH | Refills: 0 | Status: SHIPPED | OUTPATIENT
Start: 2024-10-30 | End: 2024-10-31 | Stop reason: SDUPTHER

## 2024-10-31 DIAGNOSIS — M81.0 OSTEOPOROSIS, UNSPECIFIED OSTEOPOROSIS TYPE, UNSPECIFIED PATHOLOGICAL FRACTURE PRESENCE: ICD-10-CM

## 2024-10-31 RX ORDER — DENOSUMAB 60 MG/ML
60 INJECTION SUBCUTANEOUS
Qty: 1 EACH | Refills: 0 | Status: SHIPPED | OUTPATIENT
Start: 2024-10-31

## 2024-11-06 ENCOUNTER — APPOINTMENT (OUTPATIENT)
Dept: PRIMARY CARE | Facility: CLINIC | Age: 69
End: 2024-11-06
Payer: MEDICARE

## 2024-11-06 VITALS
SYSTOLIC BLOOD PRESSURE: 118 MMHG | DIASTOLIC BLOOD PRESSURE: 68 MMHG | WEIGHT: 160 LBS | HEART RATE: 79 BPM | BODY MASS INDEX: 31.25 KG/M2 | OXYGEN SATURATION: 96 %

## 2024-11-06 DIAGNOSIS — S86.912D STRAIN OF LEFT KNEE, SUBSEQUENT ENCOUNTER: Primary | ICD-10-CM

## 2024-11-06 DIAGNOSIS — M81.0 OSTEOPOROSIS, UNSPECIFIED OSTEOPOROSIS TYPE, UNSPECIFIED PATHOLOGICAL FRACTURE PRESENCE: ICD-10-CM

## 2024-11-06 PROCEDURE — 96372 THER/PROPH/DIAG INJ SC/IM: CPT | Performed by: FAMILY MEDICINE

## 2024-11-06 PROCEDURE — 99213 OFFICE O/P EST LOW 20 MIN: CPT | Performed by: FAMILY MEDICINE

## 2024-11-06 PROCEDURE — 1160F RVW MEDS BY RX/DR IN RCRD: CPT | Performed by: FAMILY MEDICINE

## 2024-11-06 PROCEDURE — 1036F TOBACCO NON-USER: CPT | Performed by: FAMILY MEDICINE

## 2024-11-06 PROCEDURE — 1159F MED LIST DOCD IN RCRD: CPT | Performed by: FAMILY MEDICINE

## 2024-11-06 ASSESSMENT — ENCOUNTER SYMPTOMS
DEPRESSION: 0
LOSS OF SENSATION IN FEET: 0
OCCASIONAL FEELINGS OF UNSTEADINESS: 0

## 2024-11-06 NOTE — PROGRESS NOTES
Subjective   Patient ID: Ivette Tang is a 69 y.o. female who presents for Follow-up.    HPI   Pt  denies cp, sob, edema  Knee healed, less pain  Review of Systems   All other systems reviewed and are negative.      Objective   /68   Pulse 79   Wt 72.6 kg (160 lb)   SpO2 96%   BMI 31.25 kg/m²     Physical Exam  Constitutional:       Appearance: Normal appearance.   HENT:      Head: Normocephalic and atraumatic.      Right Ear: Tympanic membrane, ear canal and external ear normal.      Left Ear: Tympanic membrane, ear canal and external ear normal.      Nose: Nose normal.      Mouth/Throat:      Mouth: Mucous membranes are moist.      Pharynx: Oropharynx is clear.   Eyes:      Extraocular Movements: Extraocular movements intact.      Conjunctiva/sclera: Conjunctivae normal.      Pupils: Pupils are equal, round, and reactive to light.   Cardiovascular:      Rate and Rhythm: Normal rate and regular rhythm.      Pulses: Normal pulses.      Heart sounds: Normal heart sounds.   Pulmonary:      Effort: Pulmonary effort is normal.      Breath sounds: Normal breath sounds.   Abdominal:      General: Abdomen is flat. Bowel sounds are normal.      Palpations: Abdomen is soft.   Musculoskeletal:         General: Normal range of motion.      Cervical back: Normal range of motion and neck supple.   Skin:     General: Skin is warm and dry.      Capillary Refill: Capillary refill takes less than 2 seconds.   Neurological:      General: No focal deficit present.      Mental Status: She is alert and oriented to person, place, and time.   Psychiatric:         Mood and Affect: Mood normal.         Behavior: Behavior normal.         Thought Content: Thought content normal.         Assessment/Plan   Diagnoses and all orders for this visit:  Strain of left knee, subsequent encounter  Osteoporosis, unspecified osteoporosis type, unspecified pathological fracture presence  -     denosumab (Prolia) injection 60 mg